# Patient Record
Sex: MALE | Race: WHITE | NOT HISPANIC OR LATINO
[De-identification: names, ages, dates, MRNs, and addresses within clinical notes are randomized per-mention and may not be internally consistent; named-entity substitution may affect disease eponyms.]

---
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
## 2018-09-20 ENCOUNTER — OFFICE VISIT (OUTPATIENT)
Dept: FAMILY MEDICINE CLINIC | Facility: CLINIC | Age: 18
End: 2018-09-20
Payer: COMMERCIAL

## 2018-09-20 VITALS
WEIGHT: 163 LBS | TEMPERATURE: 98 F | SYSTOLIC BLOOD PRESSURE: 100 MMHG | RESPIRATION RATE: 16 BRPM | DIASTOLIC BLOOD PRESSURE: 60 MMHG | OXYGEN SATURATION: 97 % | BODY MASS INDEX: 25 KG/M2 | HEART RATE: 84 BPM

## 2018-09-20 DIAGNOSIS — Z72.0 TOBACCO USE: ICD-10-CM

## 2018-09-20 DIAGNOSIS — R06.2 WHEEZING: ICD-10-CM

## 2018-09-20 DIAGNOSIS — J06.9 VIRAL URI WITH COUGH: Primary | ICD-10-CM

## 2018-09-20 PROCEDURE — 99203 OFFICE O/P NEW LOW 30 MIN: CPT | Performed by: PHYSICIAN ASSISTANT

## 2018-09-20 RX ORDER — PREDNISONE 10 MG/1
20 TABLET ORAL 2 TIMES DAILY
Qty: 20 TABLET | Refills: 0 | Status: SHIPPED | OUTPATIENT
Start: 2018-09-20 | End: 2018-09-25

## 2018-09-20 RX ORDER — ALBUTEROL SULFATE 90 UG/1
2 AEROSOL, METERED RESPIRATORY (INHALATION) EVERY 4 HOURS PRN
Qty: 1 INHALER | Refills: 1 | Status: SHIPPED | OUTPATIENT
Start: 2018-09-20 | End: 2018-10-23 | Stop reason: ALTCHOICE

## 2018-09-20 NOTE — PROGRESS NOTES
CHIEF COMPLAINT:   Patient presents with:  URI: x 3-4 days. sinus and chest congestion. HPI:   Chirag Alcantar is a 21year old male who presents for upper respiratory symptoms for  4 days.  Patient reports nasal congestion, sinus pressure and pr GENERAL: well developed, well nourished,in no apparent distress  SKIN: no rashes,no suspicious lesions  HEAD: atraumatic, normocephalic.  no tenderness on palpation of  sinuses  EYES: conjunctiva clear, EOM intact  EARS: TM's clear bilaterally  NOSE: Nostr 1.  Prednisone 20 mg twice daily for 5 days. 2.  Albuterol inhaler 2 puffs inhaled at least 3  Times daily for 5 days or every 4-6 hours as needed.    3.  Encourage fluids, humidifier/vaporizor at bedside, elevate head of bed (sleep with extra pillow), va · You may use acetaminophen or ibuprofen to control pain and fever, unless another medicine was prescribed. If you have chronic liver or kidney disease, have ever had a stomach ulcer or gastrointestinal bleeding, or are taking blood-thinning medicines, linwood

## 2018-09-20 NOTE — PATIENT INSTRUCTIONS
1.  Prednisone 20 mg twice daily for 5 days. 2.  Albuterol inhaler 2 puffs inhaled at least 3  Times daily for 5 days or every 4-6 hours as needed.    3.  Encourage fluids, humidifier/vaporizor at bedside, elevate head of bed (sleep with extra pillow), va is ill with a viral infection or fever. It may cause severe liver or brain damage. · Your appetite may be poor, so a light diet is fine. Avoid dehydration by drinking 6 to 8 glasses of fluids per day (water, soft drinks, juices, tea, or soup).  Extra fluid

## 2018-10-23 ENCOUNTER — NURSE ONLY (OUTPATIENT)
Dept: FAMILY MEDICINE CLINIC | Facility: CLINIC | Age: 18
End: 2018-10-23
Payer: COMMERCIAL

## 2018-10-23 VITALS
DIASTOLIC BLOOD PRESSURE: 70 MMHG | TEMPERATURE: 98 F | BODY MASS INDEX: 24 KG/M2 | WEIGHT: 155.38 LBS | OXYGEN SATURATION: 98 % | RESPIRATION RATE: 18 BRPM | HEART RATE: 88 BPM | SYSTOLIC BLOOD PRESSURE: 110 MMHG

## 2018-10-23 DIAGNOSIS — R10.13 EPIGASTRIC PAIN: Primary | ICD-10-CM

## 2018-10-23 DIAGNOSIS — Z72.0 TOBACCO USE: ICD-10-CM

## 2018-10-23 DIAGNOSIS — R11.2 NON-INTRACTABLE VOMITING WITH NAUSEA, UNSPECIFIED VOMITING TYPE: ICD-10-CM

## 2018-10-23 DIAGNOSIS — Z87.19 H/O GASTROESOPHAGEAL REFLUX (GERD): ICD-10-CM

## 2018-10-23 PROCEDURE — 99214 OFFICE O/P EST MOD 30 MIN: CPT | Performed by: PHYSICIAN ASSISTANT

## 2018-10-23 RX ORDER — PANTOPRAZOLE SODIUM 40 MG/1
40 TABLET, DELAYED RELEASE ORAL
Qty: 30 TABLET | Refills: 0 | Status: SHIPPED | OUTPATIENT
Start: 2018-10-23

## 2018-10-23 NOTE — PATIENT INSTRUCTIONS
1.  Trial of Protonix as prescribed (this is an antacid).    2.  Lifestyle modifications as discussed:  Stop smoking, no alcohol use, avoid eating within 2 hours of bedtime/lying down, avoid NSAIDs (anti-inflammatories), avoid spicy/acidic foods, avoid carb juices (orange, grapefruit, lemon). · Don’t eat large meals, especially at night. Frequent, smaller meals are best. Don't lie down right after eating. And don’t eat anything 3 hours before going to bed. · Don't drink alcohol or smoke.  As much as possible liquids)  · Blood in the stool or vomit (red or black in color)  · Feeling weak or dizzy  · Fever of 100.4ºF (38ºC) or higher, or as directed by your healthcare provider  Date Last Reviewed: 3/1/2018  © 5490-4258 [employer].  [address]

## 2018-10-23 NOTE — PROGRESS NOTES
CHIEF COMPLAINT:   Patient presents with:  Nausea: vomiting, cold sweats  x 2 weeks       HPI:   Karen Rosario is a 21year old male who presents for complaints of nausea, vomiting and cold sweats x 2 weeks. Constant sensation of lump in throat.   ( RESPIRATORY: denies shortness of breath, cough or wheezing  GI:See HPI  NEURO: denies headaches, loss of bowel or bladder control    EXAM:   /70 (BP Location: Left arm, Patient Position: Sitting, Cuff Size: adult)   Pulse 88   Temp 97.9 °F (36.6 °C) 2.  Lifestyle modifications as discussed:  Stop smoking, no alcohol use, avoid eating within 2 hours of bedtime/lying down, avoid NSAIDs (anti-inflammatories), avoid spicy/acidic foods, avoid carbonated beverages or caffeine. See below.   3.  Follow up wit · Don’t eat large meals, especially at night. Frequent, smaller meals are best. Don't lie down right after eating. And don’t eat anything 3 hours before going to bed. · Don't drink alcohol or smoke. As much as possible, stay away from second hand smoke. · Blood in the stool or vomit (red or black in color)  · Feeling weak or dizzy  · Fever of 100.4ºF (38ºC) or higher, or as directed by your healthcare provider  Date Last Reviewed: 3/1/2018  © 4719-1351 [employer].  [address] Herman Riddhi

## 2018-11-28 VITALS
SYSTOLIC BLOOD PRESSURE: 100 MMHG | WEIGHT: 138 LBS | TEMPERATURE: 98.7 F | HEART RATE: 54 BPM | BODY MASS INDEX: 20.92 KG/M2 | DIASTOLIC BLOOD PRESSURE: 64 MMHG | OXYGEN SATURATION: 96 % | HEIGHT: 68 IN

## 2018-11-29 ENCOUNTER — CHARTING TRANS (OUTPATIENT)
Dept: OTHER | Age: 18
End: 2018-11-29

## 2019-01-04 ENCOUNTER — NURSE ONLY (OUTPATIENT)
Dept: FAMILY MEDICINE CLINIC | Facility: CLINIC | Age: 19
End: 2019-01-04
Payer: COMMERCIAL

## 2019-01-04 VITALS
BODY MASS INDEX: 24 KG/M2 | WEIGHT: 158 LBS | HEART RATE: 96 BPM | OXYGEN SATURATION: 98 % | TEMPERATURE: 99 F | SYSTOLIC BLOOD PRESSURE: 119 MMHG | DIASTOLIC BLOOD PRESSURE: 87 MMHG

## 2019-01-04 PROBLEM — Z02.9 ADMINISTRATIVE ENCOUNTER: Status: ACTIVE | Noted: 2019-01-04

## 2019-01-04 RX ORDER — FLUOXETINE HYDROCHLORIDE 20 MG/1
CAPSULE ORAL
Refills: 0 | COMMUNITY
Start: 2018-12-05

## 2019-01-04 RX ORDER — LEVOFLOXACIN 500 MG/1
TABLET, FILM COATED ORAL
Refills: 0 | COMMUNITY
Start: 2018-11-07 | End: 2019-01-04

## 2019-01-04 RX ORDER — MIRTAZAPINE 30 MG/1
TABLET, FILM COATED ORAL
Refills: 0 | COMMUNITY
Start: 2018-12-05

## 2019-01-04 NOTE — PROGRESS NOTES
Pt. To [facility] c/o dizziness on and off for 2 weeks. Reports seems to happen every other day for one to several hours. Reports feels lightheaded, dizzy, some times room spinning.   Reports happens at sporadic times throughout the day, and unsure if anything m

## 2019-01-09 ENCOUNTER — OFFICE VISIT (OUTPATIENT)
Dept: GASTROENTEROLOGY | Age: 19
End: 2019-01-09

## 2019-01-09 ENCOUNTER — LAB SERVICES (OUTPATIENT)
Dept: LAB | Age: 19
End: 2019-01-09

## 2019-01-09 VITALS
BODY MASS INDEX: 23.49 KG/M2 | HEIGHT: 68 IN | SYSTOLIC BLOOD PRESSURE: 110 MMHG | HEART RATE: 68 BPM | DIASTOLIC BLOOD PRESSURE: 60 MMHG | WEIGHT: 155 LBS

## 2019-01-09 DIAGNOSIS — K21.9 GASTROESOPHAGEAL REFLUX DISEASE WITHOUT ESOPHAGITIS: ICD-10-CM

## 2019-01-09 DIAGNOSIS — R11.0 CHRONIC NAUSEA: ICD-10-CM

## 2019-01-09 DIAGNOSIS — R11.0 CHRONIC NAUSEA: Primary | ICD-10-CM

## 2019-01-09 LAB
BASOPHIL %: 0.6 % (ref 0–1.2)
BASOPHIL ABSOLUTE #: 0 10*3/UL (ref 0–0.1)
DIFFERENTIAL TYPE: NORMAL
EOSINOPHIL %: 1.9 % (ref 0–10)
EOSINOPHIL ABSOLUTE #: 0.1 10*3/UL (ref 0–0.5)
HEMATOCRIT: 42.5 % (ref 40–51)
HEMOGLOBIN: 14.3 G/DL (ref 13.7–17.5)
LYMPH PERCENT: 24.3 % (ref 20.5–51.1)
LYMPHOCYTE ABSOLUTE #: 1.3 10*3/UL (ref 1.2–3.4)
MEAN CORPUSCULAR HGB CONCENTRATION: 33.6 % (ref 32–36)
MEAN CORPUSCULAR HGB: 30.1 PG (ref 27–34)
MEAN CORPUSCULAR VOLUME: 89.5 FL (ref 79–95)
MEAN PLATELET VOLUME: 12.4 FL (ref 8.6–12.4)
MONOCYTE ABSOLUTE #: 0.4 10*3/UL (ref 0.2–0.9)
MONOCYTE PERCENT: 8.2 % (ref 4.3–12.9)
NEUTROPHIL ABSOLUTE #: 3.5 10*3/UL (ref 1.4–6.5)
NEUTROPHIL PERCENT: 65 % (ref 34–73.5)
PLATELET COUNT: 215 10*3/UL (ref 150–400)
RED BLOOD CELL COUNT: 4.75 10*6/UL (ref 3.9–5.7)
RED CELL DISTRIBUTION WIDTH: 12.4 % (ref 11.3–14.8)
WHITE BLOOD CELL COUNT: 5.4 10*3/UL (ref 4–10)

## 2019-01-09 PROCEDURE — 36415 COLL VENOUS BLD VENIPUNCTURE: CPT | Performed by: INTERNAL MEDICINE

## 2019-01-09 PROCEDURE — 85025 COMPLETE CBC W/AUTO DIFF WBC: CPT | Performed by: INTERNAL MEDICINE

## 2019-01-09 PROCEDURE — 80053 COMPREHEN METABOLIC PANEL: CPT | Performed by: INTERNAL MEDICINE

## 2019-01-09 PROCEDURE — 83516 IMMUNOASSAY NONANTIBODY: CPT | Performed by: INTERNAL MEDICINE

## 2019-01-09 PROCEDURE — 99244 OFF/OP CNSLTJ NEW/EST MOD 40: CPT | Performed by: INTERNAL MEDICINE

## 2019-01-09 RX ORDER — FAMOTIDINE 40 MG/1
40 TABLET, FILM COATED ORAL DAILY
COMMUNITY

## 2019-01-09 RX ORDER — FLUOXETINE HYDROCHLORIDE 20 MG/1
CAPSULE ORAL
COMMUNITY
Start: 2018-12-05

## 2019-01-09 RX ORDER — PANTOPRAZOLE SODIUM 40 MG/1
TABLET, DELAYED RELEASE ORAL
COMMUNITY
Start: 2018-10-23 | End: 2019-02-05 | Stop reason: SDUPTHER

## 2019-01-09 RX ORDER — MIRTAZAPINE 30 MG/1
TABLET, FILM COATED ORAL
COMMUNITY
Start: 2018-09-30

## 2019-01-09 ASSESSMENT — ENCOUNTER SYMPTOMS
CONFUSION: 0
TREMORS: 0
CONSTIPATION: 0
HEADACHES: 0
SHORTNESS OF BREATH: 0
RECTAL PAIN: 0
BRUISES/BLEEDS EASILY: 0
ANAL BLEEDING: 0
SPEECH DIFFICULTY: 0
ABDOMINAL DISTENTION: 0
COLOR CHANGE: 0
CHEST TIGHTNESS: 0
VOMITING: 0
EYE REDNESS: 0
DIARRHEA: 0
NAUSEA: 1
SORE THROAT: 0
LIGHT-HEADEDNESS: 0
CHILLS: 0
COUGH: 0
UNEXPECTED WEIGHT CHANGE: 0
FATIGUE: 0
DIAPHORESIS: 0
APPETITE CHANGE: 0
ABDOMINAL PAIN: 1
SEIZURES: 0
BLOOD IN STOOL: 0
EYE PAIN: 0
CHOKING: 0
BACK PAIN: 0

## 2019-01-10 LAB
ALBUMIN SERPL BCG-MCNC: 5.1 G/DL (ref 3.6–5.1)
ALP SERPL-CCNC: 84 U/L (ref 45–115)
ALT SERPL W/O P-5'-P-CCNC: 21 U/L (ref 10–35)
AST SERPL-CCNC: 20 U/L (ref 9–37)
BILIRUB SERPL-MCNC: <0.2 MG/DL (ref 0–1)
BUN SERPL-MCNC: 11 MG/DL (ref 6–27)
CALCIUM SERPL-MCNC: 9.8 MG/DL (ref 8.6–10.6)
CHLORIDE SERPL-SCNC: 102 MMOL/L (ref 96–107)
CREAT SERPL-MCNC: 0.9 MG/DL (ref 0.6–1.6)
GFR SERPL CREATININE-BSD FRML MDRD: >60 ML/MIN/{1.73M2}
GFR SERPL CREATININE-BSD FRML MDRD: >60 ML/MIN/{1.73M2}
GLIADIN PEPTIDE IGA SER IA-ACNC: 0.7 U/ML (ref 0–7)
GLIADIN PEPTIDE IGG SER IA-ACNC: 0.4 U/ML (ref 0–7)
GLUCOSE SERPL-MCNC: 108 MG/DL (ref 70–200)
HCO3 SERPL-SCNC: 27 MMOL/L (ref 22–32)
POTASSIUM SERPL-SCNC: 4.2 MMOL/L (ref 3.5–5.3)
PROT SERPL-MCNC: 7.1 G/DL (ref 6.2–8.1)
SODIUM SERPL-SCNC: 140 MMOL/L (ref 136–146)
TTG IGA SER IA-ACNC: 0.3 U/ML (ref 0–7)
TTG IGG SER IA-ACNC: <0.6 U/ML (ref 0–7)

## 2019-01-29 ENCOUNTER — TELEPHONE (OUTPATIENT)
Dept: GASTROENTEROLOGY | Age: 19
End: 2019-01-29

## 2019-01-29 ENCOUNTER — OFFICE VISIT (OUTPATIENT)
Dept: FAMILY MEDICINE CLINIC | Facility: CLINIC | Age: 19
End: 2019-01-29
Payer: COMMERCIAL

## 2019-01-29 VITALS
RESPIRATION RATE: 18 BRPM | OXYGEN SATURATION: 98 % | TEMPERATURE: 98 F | SYSTOLIC BLOOD PRESSURE: 120 MMHG | DIASTOLIC BLOOD PRESSURE: 76 MMHG | HEART RATE: 94 BPM

## 2019-01-29 DIAGNOSIS — Z20.818 STREPTOCOCCUS EXPOSURE: ICD-10-CM

## 2019-01-29 DIAGNOSIS — J00 ACUTE NASOPHARYNGITIS: Primary | ICD-10-CM

## 2019-01-29 LAB — CONTROL LINE PRESENT WITH A CLEAR BACKGROUND (YES/NO): YES YES/NO

## 2019-01-29 PROCEDURE — 87880 STREP A ASSAY W/OPTIC: CPT | Performed by: PHYSICIAN ASSISTANT

## 2019-01-29 PROCEDURE — 99213 OFFICE O/P EST LOW 20 MIN: CPT | Performed by: PHYSICIAN ASSISTANT

## 2019-01-30 NOTE — PROGRESS NOTES
CHIEF COMPLAINT:   Patient presents with:  Sinus Problem: sinus pressure and congestion, cough, sore throat, HA x 3 days       HPI:   Elizabeth Tariq is a 19year old male who presents for upper respiratory symptoms for  3 days.  Patient reports sore thr NOSE: Nostrils patent, clear nasal discharge, nasal mucosa edematous/erythematous   THROAT: Oral mucosa pink, moist. Posterior pharynx is (+)erythematous. without exudates. Tonsils 1+/4.     NECK: Supple, non-tender  LUNGS: clear to auscultation bilaterally Understanding the Cold Virus  Colds are the most common illness that people get. Most adults get 2 or 3 colds per year, and most children get 5 to 7 colds per year. Colds may be caused by over 200 types of viruses.  The most common of these are rhinoviruses You can help reduce the spread of cold viruses. This can help both you and others avoid getting colds. Follow these tips:  · Wash your hands well anytime you may have come into contact with cold viruses. Wash your hands for at least 20 seconds.  When you ca

## 2019-01-30 NOTE — PATIENT INSTRUCTIONS
1.  Rapid strep negative.    2.  Encourage fluids, humidifier/vaporizor at bedside, elevate head of bed (sleep with extra pillow), vapor rub to chest, steam therapy if no fever, warm compresses for sinus pressure if no fever, salt water gargles for sore thr nasal symptoms, including stuffiness. · Prescription or over-the-counter pain medicines. These can help with headaches and sore throat. · Self-care. This includes extra rest, using humidifiers, and drinking more fluids.  These help you feel better while y worse after about 10 days  · Headache, sleepiness, or confusion that gets worse   Date Last Reviewed: 3/28/2016  © 2559-0266 [employer]. [address] [facility], [address] [place] [address]. All rights reserved.  This information is not intended as a gaviria

## 2019-02-05 ENCOUNTER — APPOINTMENT (OUTPATIENT)
Dept: GASTROENTEROLOGY | Age: 19
End: 2019-02-05
Attending: INTERNAL MEDICINE

## 2019-02-05 DIAGNOSIS — R11.0 NAUSEA: ICD-10-CM

## 2019-02-05 DIAGNOSIS — K29.70 GASTRITIS WITHOUT BLEEDING: ICD-10-CM

## 2019-02-05 DIAGNOSIS — R11.0 CHRONIC NAUSEA: Primary | ICD-10-CM

## 2019-02-05 PROCEDURE — 88305 TISSUE EXAM BY PATHOLOGIST: CPT | Performed by: INTERNAL MEDICINE

## 2019-02-05 PROCEDURE — 43239 EGD BIOPSY SINGLE/MULTIPLE: CPT | Performed by: INTERNAL MEDICINE

## 2019-02-05 RX ORDER — PANTOPRAZOLE SODIUM 40 MG/1
40 TABLET, DELAYED RELEASE ORAL 2 TIMES DAILY
Qty: 60 TABLET | Refills: 3 | Status: SHIPPED | OUTPATIENT
Start: 2019-02-05 | End: 2019-03-07

## 2019-02-07 LAB — AP REPORT: NORMAL

## 2019-02-26 ENCOUNTER — OFFICE VISIT (OUTPATIENT)
Dept: FAMILY MEDICINE CLINIC | Facility: CLINIC | Age: 19
End: 2019-02-26
Payer: COMMERCIAL

## 2019-02-26 VITALS
TEMPERATURE: 99 F | OXYGEN SATURATION: 98 % | SYSTOLIC BLOOD PRESSURE: 110 MMHG | RESPIRATION RATE: 18 BRPM | HEART RATE: 79 BPM | DIASTOLIC BLOOD PRESSURE: 72 MMHG

## 2019-02-26 DIAGNOSIS — K29.00 ACUTE GASTRITIS, PRESENCE OF BLEEDING UNSPECIFIED, UNSPECIFIED GASTRITIS TYPE: ICD-10-CM

## 2019-02-26 DIAGNOSIS — Z02.89 ENCOUNTER TO OBTAIN EXCUSE FROM WORK: Primary | ICD-10-CM

## 2019-02-26 PROCEDURE — 99213 OFFICE O/P EST LOW 20 MIN: CPT | Performed by: PHYSICIAN ASSISTANT

## 2019-02-26 NOTE — PROGRESS NOTES
CHIEF COMPLAINT:     Patient presents with:  Vomiting: x 1 day       HPI:   Lew Montes is a 19year old male who presents today requesting work excuse note. Missed yesterday due to n/v.   Reports felt nauseated 2 days ago, followed by n/v all day y developed, well nourished,in no apparent distress  SKIN: no rashes,no suspicious lesions  HEENT;  NCAT, eomi,sclera anicteric, ext ears/TMs clear, op clear with mmm  NECK Supple, no lad, trachea midline   RESP  CTA lamin, no w/r/r.  Normal effort  CV  RRR, no

## 2019-03-05 VITALS
HEIGHT: 68 IN | BODY MASS INDEX: 24.1 KG/M2 | WEIGHT: 159 LBS | DIASTOLIC BLOOD PRESSURE: 70 MMHG | TEMPERATURE: 98.3 F | SYSTOLIC BLOOD PRESSURE: 118 MMHG | HEART RATE: 94 BPM | OXYGEN SATURATION: 97 %

## 2019-03-11 ENCOUNTER — TELEPHONE (OUTPATIENT)
Dept: SCHEDULING | Age: 19
End: 2019-03-11

## 2019-03-11 RX ORDER — PANTOPRAZOLE SODIUM 40 MG/1
40 TABLET, DELAYED RELEASE ORAL 2 TIMES DAILY
Qty: 60 TABLET | Refills: 5 | Status: SHIPPED | OUTPATIENT
Start: 2019-03-11 | End: 2019-07-08 | Stop reason: SDUPTHER

## 2019-04-11 ENCOUNTER — OFFICE VISIT (OUTPATIENT)
Dept: FAMILY MEDICINE CLINIC | Facility: CLINIC | Age: 19
End: 2019-04-11
Payer: COMMERCIAL

## 2019-04-11 VITALS
RESPIRATION RATE: 18 BRPM | DIASTOLIC BLOOD PRESSURE: 68 MMHG | BODY MASS INDEX: 23.49 KG/M2 | SYSTOLIC BLOOD PRESSURE: 106 MMHG | TEMPERATURE: 98 F | HEART RATE: 96 BPM | HEIGHT: 68 IN | OXYGEN SATURATION: 97 % | WEIGHT: 155 LBS

## 2019-04-11 DIAGNOSIS — R11.10 VOMITING AND DIARRHEA: Primary | ICD-10-CM

## 2019-04-11 DIAGNOSIS — R19.7 VOMITING AND DIARRHEA: Primary | ICD-10-CM

## 2019-04-11 PROCEDURE — 99213 OFFICE O/P EST LOW 20 MIN: CPT | Performed by: PHYSICIAN ASSISTANT

## 2019-04-11 RX ORDER — ONDANSETRON 4 MG/1
4 TABLET, ORALLY DISINTEGRATING ORAL EVERY 8 HOURS PRN
Qty: 6 TABLET | Refills: 0 | Status: SHIPPED | OUTPATIENT
Start: 2019-04-11

## 2019-04-11 NOTE — PROGRESS NOTES
CHIEF COMPLAINT:     Patient presents with:  Nausea: with vomitting and diarrhea, feels dehydrated, temp of 100.00 yesterday - x3 days      HPI:   Karen Rosario is a 19year old male who presents with complaints of vomiting and diarrhea for the past 2 Comment: cannabis 1-3 grams daily since 5/2018, last use9/1/18        REVIEW OF SYSTEMS:   GENERAL: Denies fever, chills,weight change, decreased appetite  SKIN: Denies rashes, skin wounds or ulcers.   EYES: Denies blurred vision or double vision  HENT: 3. Increase fluids  4. Follow up with GI  5.  If worsening symptoms seek treatment      Nonspecific Vomiting and Diarrhea (Adult)  Vomiting and diarrhea can have many causes, including:  · Helping your body get rid of harmful substances   · Gastroenteritis · Caffeine, tobacco, and alcohol can make the diarrhea, cramping, and pain worse. Remember, caffeine not only is in coffee, but also is in chocolate, some energy drinks, and teas. Diet  · Water and clear liquids are important so you don't get dehydrated. · Limit fiber. Avoid raw or cooked vegetables, fresh fruits (except bananas) and bran cereals. · Limit caffeine and chocolate. No spices or seasonings except salt.   During the next 24 hours:  · Gradually resume a normal diet, as you feel better and your s · Fever of 100.4°F (38.0°C) or higher, or as directed by your healthcare provider  · Yellowish color to your skin or the whites of your eyes  · Signs of dehydration, such as dry mouth, little urine (less than every 6 hours), or very dark urine  Date Last R

## 2019-07-08 ENCOUNTER — TELEPHONE (OUTPATIENT)
Dept: GASTROENTEROLOGY | Age: 19
End: 2019-07-08

## 2019-07-08 RX ORDER — PANTOPRAZOLE SODIUM 40 MG/1
TABLET, DELAYED RELEASE ORAL
Qty: 180 TABLET | Refills: 1 | Status: SHIPPED | OUTPATIENT
Start: 2019-07-08 | End: 2020-07-01

## 2019-11-05 ENCOUNTER — OFFICE VISIT (OUTPATIENT)
Dept: FAMILY MEDICINE CLINIC | Facility: CLINIC | Age: 19
End: 2019-11-05
Payer: COMMERCIAL

## 2019-11-05 VITALS
BODY MASS INDEX: 24 KG/M2 | HEART RATE: 107 BPM | WEIGHT: 155 LBS | RESPIRATION RATE: 16 BRPM | SYSTOLIC BLOOD PRESSURE: 110 MMHG | DIASTOLIC BLOOD PRESSURE: 66 MMHG | OXYGEN SATURATION: 97 % | TEMPERATURE: 100 F

## 2019-11-05 DIAGNOSIS — J03.90 EXUDATIVE TONSILLITIS: Primary | ICD-10-CM

## 2019-11-05 DIAGNOSIS — J02.9 SORE THROAT: ICD-10-CM

## 2019-11-05 PROCEDURE — 87880 STREP A ASSAY W/OPTIC: CPT | Performed by: PHYSICIAN ASSISTANT

## 2019-11-05 PROCEDURE — 87081 CULTURE SCREEN ONLY: CPT | Performed by: PHYSICIAN ASSISTANT

## 2019-11-05 PROCEDURE — 86308 HETEROPHILE ANTIBODY SCREEN: CPT | Performed by: PHYSICIAN ASSISTANT

## 2019-11-05 PROCEDURE — 99213 OFFICE O/P EST LOW 20 MIN: CPT | Performed by: PHYSICIAN ASSISTANT

## 2019-11-05 RX ORDER — AZITHROMYCIN 250 MG/1
TABLET, FILM COATED ORAL
Qty: 6 TABLET | Refills: 0 | Status: SHIPPED | OUTPATIENT
Start: 2019-11-05

## 2019-11-05 NOTE — PROGRESS NOTES
CHIEF COMPLAINT:   Patient presents with:  Sore Throat: x 2 days. fever x yesterday only. pt did not take temp. no couh/congestion      HPI:   Cathie Pop is a 22year old male who presents for sore throat 7/10 for  2 days.   Patient reports he kenny /66 (BP Location: Left arm, Patient Position: Sitting, Cuff Size: adult)   Pulse 107   Temp 99.8 °F (37.7 °C) (Tympanic)   Resp 16   Wt 155 lb (70.3 kg)   SpO2 97%   BMI 23.57 kg/m²   GENERAL: well developed, well nourished,in no apparent distress  S Erendira Reyna is a 22year old male who presents with symptoms that are consistent with    ASSESSMENT:   Sore throat  Exudative tonsillitis  (primary encounter diagnosis)    PLAN: Meds as below. See patient Instructions.  -Will send throat culture. · Rest at home. Drink plenty of fluids so you won't get dehydrated. · If the test is positive for strep, you or your child should not go to work or school for the first 2 days of taking the antibiotics.  After this time, you or your child will not be conta Follow up with your healthcare provider or our staff if you or your child don't get better over the next week. When to seek medical advice  Call your healthcare provider right away if any of these occur:  · Fever as directed by your healthcare provider.  Mya Aldana

## 2019-11-05 NOTE — PATIENT INSTRUCTIONS
-Push fluids  -Tylenol  -Chloraseptic spray  -Tea with honey  -If you are unable to swallow or have any worsening symptoms, please go to the ER. Pharyngitis (Sore Throat), Report Pending    Pharyngitis (sore throat) is often due to a virus.  It can als warm salt water will also help reduce throat pain. Dissolve 1/2 teaspoon of salt in 1 glass of warm water. Children can sip on juice or a popsicle. Children 5 years and older can also suck on a lollipop or hard candy.   · Don't eat salty or spicy foods or g dizziness  · Trouble breathing or noisy breathing  · Muffled voice  · New rash  · Other symptoms getting worse  Prevention  Here are steps you can take to help prevent an infection:  · Keep good hand washing habits.   · Don’t have close contact with people

## 2020-01-01 NOTE — LETTER
Date: 6/25/2022    Patient Name: Phyllis Ponce          To Whom it may concern: This letter has been written at the patient's request. The above patient was seen at the [facility] for treatment of a medical condition. This patient should be excused from attending work 6/17-18-9/2022, and 6/22-23/2022. .    The patient may return to work 6/27/22  With no restrictions.         Sincerely,          Manolo Escalante, DO You can access the FollowMyHealth Patient Portal offered by [facility] by registering at the following website: [email or web address]. By joining [facility]’s FollowMyHealth portal, you will also be able to view your health information using other applications (apps) compatible with our system.

## 2020-07-01 RX ORDER — PANTOPRAZOLE SODIUM 40 MG/1
TABLET, DELAYED RELEASE ORAL
Qty: 180 TABLET | Refills: 0 | Status: SHIPPED | OUTPATIENT
Start: 2020-07-01 | End: 2020-09-22

## 2020-09-16 ENCOUNTER — V-VISIT (OUTPATIENT)
Dept: GASTROENTEROLOGY | Age: 20
End: 2020-09-16

## 2020-09-16 DIAGNOSIS — R11.0 CHRONIC NAUSEA: Primary | ICD-10-CM

## 2020-09-16 PROCEDURE — 99214 OFFICE O/P EST MOD 30 MIN: CPT | Performed by: INTERNAL MEDICINE

## 2020-09-16 RX ORDER — TRAZODONE HYDROCHLORIDE 50 MG/1
50 TABLET ORAL AT BEDTIME
COMMUNITY
Start: 2020-07-09

## 2020-09-16 ASSESSMENT — ENCOUNTER SYMPTOMS
CONSTIPATION: 0
EYE PAIN: 0
CHOKING: 0
SPEECH DIFFICULTY: 0
SORE THROAT: 0
HEADACHES: 0
BLOOD IN STOOL: 0
SEIZURES: 0
DIARRHEA: 0
TREMORS: 0
APPETITE CHANGE: 0
CHILLS: 0
BRUISES/BLEEDS EASILY: 0
ABDOMINAL PAIN: 0
UNEXPECTED WEIGHT CHANGE: 0
VOMITING: 0
COLOR CHANGE: 0
ABDOMINAL DISTENTION: 1
SHORTNESS OF BREATH: 0
EYE REDNESS: 0
COUGH: 0
DIAPHORESIS: 0
CONFUSION: 0
FATIGUE: 0
RECTAL PAIN: 0
ANAL BLEEDING: 0
BACK PAIN: 0
NAUSEA: 1
LIGHT-HEADEDNESS: 0
CHEST TIGHTNESS: 0

## 2020-09-22 RX ORDER — PANTOPRAZOLE SODIUM 40 MG/1
TABLET, DELAYED RELEASE ORAL
Qty: 180 TABLET | Refills: 0 | Status: SHIPPED | OUTPATIENT
Start: 2020-09-22 | End: 2021-01-11

## 2021-01-11 RX ORDER — PANTOPRAZOLE SODIUM 40 MG/1
TABLET, DELAYED RELEASE ORAL
Qty: 180 TABLET | Refills: 2 | Status: SHIPPED | OUTPATIENT
Start: 2021-01-11

## 2022-05-16 ENCOUNTER — OFFICE VISIT (OUTPATIENT)
Dept: FAMILY MEDICINE CLINIC | Facility: CLINIC | Age: 22
End: 2022-05-16
Payer: COMMERCIAL

## 2022-05-16 VITALS
WEIGHT: 165 LBS | TEMPERATURE: 98 F | OXYGEN SATURATION: 98 % | SYSTOLIC BLOOD PRESSURE: 134 MMHG | HEART RATE: 81 BPM | DIASTOLIC BLOOD PRESSURE: 64 MMHG | RESPIRATION RATE: 18 BRPM | BODY MASS INDEX: 25 KG/M2

## 2022-05-16 DIAGNOSIS — R10.13 ABDOMINAL PAIN, EPIGASTRIC: ICD-10-CM

## 2022-05-16 DIAGNOSIS — K92.0 HEMATEMESIS WITH NAUSEA: Primary | ICD-10-CM

## 2022-05-16 NOTE — PROGRESS NOTES
Rexie Goldberg is a 19year old male who presents to [facility] with c/o abdominal with nausea and vomiting. Pt notes nausea and 4 episodes of vomiting this past week. Notes last night he developed generalized abdominal pain and now notes blood in emesis. Reports hx of ulcers. Discussed HPI and exam with pt. We discussed the limited diagnostic capacity of this clinic and there are dangerous conditions associated with his abdominal pain, vomiting and bloody emesis that I can not fully rule out. Accompanied by: self  After triage, higher acuity of care was recommended to Rexie Goldberg today. Rationale: Need for further evaluation and management outside the scope of practice for the walk in clinic  Site recommendation: [facility] or Main ED   Patient declined transport via EMS or having someone else drive him. Patient/parent verbalized understanding of rationale for further evaluation and was stable upon discharge.

## 2022-06-09 ENCOUNTER — OFFICE VISIT (OUTPATIENT)
Dept: FAMILY MEDICINE CLINIC | Facility: CLINIC | Age: 22
End: 2022-06-09
Payer: COMMERCIAL

## 2022-06-09 VITALS
BODY MASS INDEX: 25.76 KG/M2 | HEIGHT: 68 IN | WEIGHT: 170 LBS | SYSTOLIC BLOOD PRESSURE: 110 MMHG | TEMPERATURE: 98 F | RESPIRATION RATE: 16 BRPM | DIASTOLIC BLOOD PRESSURE: 70 MMHG | HEART RATE: 81 BPM | OXYGEN SATURATION: 97 %

## 2022-06-09 DIAGNOSIS — R11.0 NAUSEA: Primary | ICD-10-CM

## 2022-06-09 DIAGNOSIS — K21.9 GASTROESOPHAGEAL REFLUX DISEASE, UNSPECIFIED WHETHER ESOPHAGITIS PRESENT: ICD-10-CM

## 2022-06-09 PROCEDURE — 3074F SYST BP LT 130 MM HG: CPT | Performed by: PHYSICIAN ASSISTANT

## 2022-06-09 PROCEDURE — 3008F BODY MASS INDEX DOCD: CPT | Performed by: PHYSICIAN ASSISTANT

## 2022-06-09 PROCEDURE — 99213 OFFICE O/P EST LOW 20 MIN: CPT | Performed by: PHYSICIAN ASSISTANT

## 2022-06-09 PROCEDURE — 3078F DIAST BP <80 MM HG: CPT | Performed by: PHYSICIAN ASSISTANT

## 2022-06-09 RX ORDER — PANTOPRAZOLE SODIUM 20 MG/1
20 TABLET, DELAYED RELEASE ORAL
Qty: 20 TABLET | Refills: 0 | Status: SHIPPED | OUTPATIENT
Start: 2022-06-09

## 2022-06-09 RX ORDER — ONDANSETRON 4 MG/1
4 TABLET, FILM COATED ORAL EVERY 8 HOURS PRN
Qty: 20 TABLET | Refills: 0 | Status: SHIPPED | OUTPATIENT
Start: 2022-06-09

## 2022-06-22 ENCOUNTER — TELEPHONE (OUTPATIENT)
Dept: FAMILY MEDICINE CLINIC | Facility: CLINIC | Age: 22
End: 2022-06-22

## 2022-06-22 NOTE — TELEPHONE ENCOUNTER
Rn spoke with Dr. Severino Ann regarding visit on Saturday. Dr. Seevrino Ann advises that if pt is still having stomach issues he needs to be contacting the surgeon who did his procedure to follow up with them because he is still with in the recovery period.     Pt states his issues are nausea and acid reflux which he has had for year    Per DS - okay to keep visit

## 2022-06-25 ENCOUNTER — OFFICE VISIT (OUTPATIENT)
Dept: FAMILY MEDICINE CLINIC | Facility: CLINIC | Age: 22
End: 2022-06-25
Payer: COMMERCIAL

## 2022-06-25 VITALS
DIASTOLIC BLOOD PRESSURE: 64 MMHG | HEIGHT: 68 IN | HEART RATE: 71 BPM | WEIGHT: 171 LBS | TEMPERATURE: 97 F | RESPIRATION RATE: 16 BRPM | SYSTOLIC BLOOD PRESSURE: 104 MMHG | BODY MASS INDEX: 25.91 KG/M2 | OXYGEN SATURATION: 99 %

## 2022-06-25 DIAGNOSIS — R11.15 CYCLICAL VOMITING: ICD-10-CM

## 2022-06-25 DIAGNOSIS — R79.89 ELEVATED LIVER FUNCTION TESTS: Primary | ICD-10-CM

## 2022-06-25 DIAGNOSIS — K21.00 GASTROESOPHAGEAL REFLUX DISEASE WITH ESOPHAGITIS WITHOUT HEMORRHAGE: ICD-10-CM

## 2022-06-25 PROBLEM — F32.A DEPRESSION: Status: ACTIVE | Noted: 2022-06-25

## 2022-06-25 LAB
ALBUMIN SERPL-MCNC: 4.3 G/DL (ref 3.4–5)
ALBUMIN/GLOB SERPL: 1.3 {RATIO} (ref 1–2)
ALP LIVER SERPL-CCNC: 62 U/L
ALT SERPL-CCNC: 33 U/L
ANION GAP SERPL CALC-SCNC: 4 MMOL/L (ref 0–18)
AST SERPL-CCNC: 21 U/L (ref 15–37)
BILIRUB SERPL-MCNC: 0.2 MG/DL (ref 0.1–2)
BUN BLD-MCNC: 11 MG/DL (ref 7–18)
CALCIUM BLD-MCNC: 9.2 MG/DL (ref 8.5–10.1)
CHLORIDE SERPL-SCNC: 109 MMOL/L (ref 98–112)
CO2 SERPL-SCNC: 27 MMOL/L (ref 21–32)
CREAT BLD-MCNC: 1.1 MG/DL
FASTING STATUS PATIENT QL REPORTED: NO
GLOBULIN PLAS-MCNC: 3.4 G/DL (ref 2.8–4.4)
GLUCOSE BLD-MCNC: 95 MG/DL (ref 70–99)
OSMOLALITY SERPL CALC.SUM OF ELEC: 289 MOSM/KG (ref 275–295)
POTASSIUM SERPL-SCNC: 4.3 MMOL/L (ref 3.5–5.1)
PROT SERPL-MCNC: 7.7 G/DL (ref 6.4–8.2)
SODIUM SERPL-SCNC: 140 MMOL/L (ref 136–145)

## 2022-06-25 PROCEDURE — 3074F SYST BP LT 130 MM HG: CPT | Performed by: FAMILY MEDICINE

## 2022-06-25 PROCEDURE — 80053 COMPREHEN METABOLIC PANEL: CPT | Performed by: FAMILY MEDICINE

## 2022-06-25 PROCEDURE — 3008F BODY MASS INDEX DOCD: CPT | Performed by: FAMILY MEDICINE

## 2022-06-25 PROCEDURE — 99204 OFFICE O/P NEW MOD 45 MIN: CPT | Performed by: FAMILY MEDICINE

## 2022-06-25 PROCEDURE — 3078F DIAST BP <80 MM HG: CPT | Performed by: FAMILY MEDICINE

## 2022-06-25 RX ORDER — MIRTAZAPINE 30 MG/1
30 TABLET, FILM COATED ORAL NIGHTLY
COMMUNITY

## 2022-06-25 RX ORDER — OMEPRAZOLE 40 MG/1
40 CAPSULE, DELAYED RELEASE ORAL DAILY
Qty: 60 CAPSULE | Refills: 3 | Status: SHIPPED | OUTPATIENT
Start: 2022-06-25 | End: 2022-07-25

## 2022-06-25 RX ORDER — OMEPRAZOLE 40 MG/1
40 CAPSULE, DELAYED RELEASE ORAL DAILY
COMMUNITY
End: 2022-06-25

## 2022-07-08 ENCOUNTER — OFFICE VISIT (OUTPATIENT)
Dept: FAMILY MEDICINE CLINIC | Facility: CLINIC | Age: 22
End: 2022-07-08
Payer: COMMERCIAL

## 2022-07-08 VITALS
RESPIRATION RATE: 16 BRPM | BODY MASS INDEX: 26.07 KG/M2 | WEIGHT: 172 LBS | HEIGHT: 68 IN | DIASTOLIC BLOOD PRESSURE: 60 MMHG | HEART RATE: 89 BPM | TEMPERATURE: 97 F | SYSTOLIC BLOOD PRESSURE: 100 MMHG | OXYGEN SATURATION: 100 %

## 2022-07-08 DIAGNOSIS — R11.15 CYCLICAL VOMITING: Primary | ICD-10-CM

## 2022-07-08 DIAGNOSIS — F33.0 MAJOR DEPRESSIVE DISORDER, RECURRENT, MILD (HCC): ICD-10-CM

## 2022-07-08 DIAGNOSIS — F12.90 MARIJUANA USE: ICD-10-CM

## 2022-07-08 DIAGNOSIS — R11.0 NAUSEA: ICD-10-CM

## 2022-07-08 DIAGNOSIS — K21.00 GASTROESOPHAGEAL REFLUX DISEASE WITH ESOPHAGITIS WITHOUT HEMORRHAGE: ICD-10-CM

## 2022-07-08 DIAGNOSIS — R14.0 ABDOMINAL BLOATING: ICD-10-CM

## 2022-07-08 PROCEDURE — 99214 OFFICE O/P EST MOD 30 MIN: CPT | Performed by: FAMILY MEDICINE

## 2022-07-08 PROCEDURE — 3008F BODY MASS INDEX DOCD: CPT | Performed by: FAMILY MEDICINE

## 2022-07-08 PROCEDURE — 3074F SYST BP LT 130 MM HG: CPT | Performed by: FAMILY MEDICINE

## 2022-07-08 PROCEDURE — 3078F DIAST BP <80 MM HG: CPT | Performed by: FAMILY MEDICINE

## 2022-07-08 RX ORDER — FLUOXETINE HYDROCHLORIDE 20 MG/1
CAPSULE ORAL
Qty: 30 CAPSULE | Refills: 0 | Status: SHIPPED | OUTPATIENT
Start: 2022-07-08

## 2022-07-08 RX ORDER — MIRTAZAPINE 30 MG/1
30 TABLET, FILM COATED ORAL NIGHTLY
Qty: 30 TABLET | Refills: 0 | Status: SHIPPED | OUTPATIENT
Start: 2022-07-08 | End: 2022-08-07

## 2022-07-08 RX ORDER — ONDANSETRON 4 MG/1
4 TABLET, FILM COATED ORAL EVERY 8 HOURS PRN
Qty: 20 TABLET | Refills: 2 | Status: SHIPPED | OUTPATIENT
Start: 2022-07-08

## 2022-08-15 ENCOUNTER — OFFICE VISIT (OUTPATIENT)
Dept: FAMILY MEDICINE CLINIC | Facility: CLINIC | Age: 22
End: 2022-08-15
Payer: COMMERCIAL

## 2022-08-15 VITALS
TEMPERATURE: 98 F | SYSTOLIC BLOOD PRESSURE: 80 MMHG | DIASTOLIC BLOOD PRESSURE: 60 MMHG | HEART RATE: 89 BPM | BODY MASS INDEX: 26 KG/M2 | OXYGEN SATURATION: 98 % | WEIGHT: 171.19 LBS

## 2022-08-15 DIAGNOSIS — K21.00 GASTROESOPHAGEAL REFLUX DISEASE WITH ESOPHAGITIS WITHOUT HEMORRHAGE: ICD-10-CM

## 2022-08-15 DIAGNOSIS — R14.0 ABDOMINAL BLOATING: ICD-10-CM

## 2022-08-15 DIAGNOSIS — R11.0 NAUSEA: ICD-10-CM

## 2022-08-15 DIAGNOSIS — R11.15 CYCLICAL VOMITING: Primary | ICD-10-CM

## 2022-08-15 PROCEDURE — 99214 OFFICE O/P EST MOD 30 MIN: CPT | Performed by: FAMILY MEDICINE

## 2022-08-15 PROCEDURE — 3074F SYST BP LT 130 MM HG: CPT | Performed by: FAMILY MEDICINE

## 2022-08-15 PROCEDURE — 3078F DIAST BP <80 MM HG: CPT | Performed by: FAMILY MEDICINE

## 2022-08-15 RX ORDER — PANTOPRAZOLE SODIUM 40 MG/1
40 TABLET, DELAYED RELEASE ORAL
Qty: 60 TABLET | Refills: 1 | Status: SHIPPED | OUTPATIENT
Start: 2022-08-15 | End: 2022-09-14

## 2022-08-29 ENCOUNTER — OFFICE VISIT (OUTPATIENT)
Dept: FAMILY MEDICINE CLINIC | Facility: CLINIC | Age: 22
End: 2022-08-29
Payer: COMMERCIAL

## 2022-08-29 VITALS
RESPIRATION RATE: 18 BRPM | DIASTOLIC BLOOD PRESSURE: 60 MMHG | HEIGHT: 68 IN | TEMPERATURE: 98 F | SYSTOLIC BLOOD PRESSURE: 120 MMHG | HEART RATE: 104 BPM | WEIGHT: 172 LBS | OXYGEN SATURATION: 98 % | BODY MASS INDEX: 26.07 KG/M2

## 2022-08-29 DIAGNOSIS — R79.89 ELEVATED LIVER FUNCTION TESTS: ICD-10-CM

## 2022-08-29 DIAGNOSIS — R10.84 GENERALIZED ABDOMINAL PAIN: Primary | ICD-10-CM

## 2022-08-29 PROCEDURE — 99214 OFFICE O/P EST MOD 30 MIN: CPT | Performed by: FAMILY MEDICINE

## 2022-08-29 PROCEDURE — 3078F DIAST BP <80 MM HG: CPT | Performed by: FAMILY MEDICINE

## 2022-08-29 PROCEDURE — 3008F BODY MASS INDEX DOCD: CPT | Performed by: FAMILY MEDICINE

## 2022-08-29 PROCEDURE — 3074F SYST BP LT 130 MM HG: CPT | Performed by: FAMILY MEDICINE

## 2022-08-29 RX ORDER — ONDANSETRON 4 MG/1
4 TABLET, ORALLY DISINTEGRATING ORAL EVERY 6 HOURS PRN
COMMUNITY
Start: 2022-08-15

## 2022-09-07 ENCOUNTER — MED REC SCAN ONLY (OUTPATIENT)
Dept: FAMILY MEDICINE CLINIC | Facility: CLINIC | Age: 22
End: 2022-09-07

## 2022-09-07 ENCOUNTER — TELEPHONE (OUTPATIENT)
Dept: FAMILY MEDICINE CLINIC | Facility: CLINIC | Age: 22
End: 2022-09-07

## 2022-09-07 DIAGNOSIS — R11.0 NAUSEA: Primary | ICD-10-CM

## 2022-09-07 DIAGNOSIS — R11.15 CYCLICAL VOMITING: ICD-10-CM

## 2022-09-07 DIAGNOSIS — K21.00 GASTROESOPHAGEAL REFLUX DISEASE WITH ESOPHAGITIS WITHOUT HEMORRHAGE: ICD-10-CM

## 2022-09-07 NOTE — TELEPHONE ENCOUNTER
So I did get it back, and there was nothing significant found on the US, no stones, the ducts look good, the spleen and kidneys look good, I think it's time for him to go back and see the gastroenterologist. Lu Amos pretty much tested everything I can from my end. He may need to have another scope of t to maybe check the duct. It looks like he saw Dr. Ana Townsend at [facility].  And that would probably be  The best route to go since he's already seen him in the past. I will place a referral for him

## 2022-09-07 NOTE — TELEPHONE ENCOUNTER
PATIENT CALLING STATING THAT DR RODRIGUEZ HAD ASKED HIM TO CALL THE OFFICE ONCE HE HAD HIS ultrasound. PATIENT CALLING NOW TO INFORM OFFICE THAT HE HAD HIS ultrasound DONE AT [facility] IN [place] [id number] ON Sunday. ULTRASOUND OF ABDOMEN.

## 2022-09-07 NOTE — TELEPHONE ENCOUNTER
Pt was advised of results- verbalized understanding    Pt was provided with number to Dr. Pb Clark referral was placed as Internal- pt does not have IHP.     RN canceled and reordered

## 2022-09-15 ENCOUNTER — TELEPHONE (OUTPATIENT)
Dept: FAMILY MEDICINE CLINIC | Facility: CLINIC | Age: 22
End: 2022-09-15

## 2022-09-17 RX ORDER — PANTOPRAZOLE SODIUM 40 MG/1
TABLET, DELAYED RELEASE ORAL
Qty: 60 TABLET | Refills: 1 | Status: SHIPPED | OUTPATIENT
Start: 2022-09-17

## 2022-10-03 RX ORDER — PANTOPRAZOLE SODIUM 40 MG/1
40 TABLET, DELAYED RELEASE ORAL
Qty: 60 TABLET | Refills: 3 | Status: SHIPPED | OUTPATIENT
Start: 2022-10-03

## 2022-10-04 RX ORDER — ONDANSETRON 4 MG/1
4 TABLET, FILM COATED ORAL EVERY 8 HOURS PRN
Qty: 20 TABLET | Refills: 2 | Status: SHIPPED | OUTPATIENT
Start: 2022-10-04

## 2022-12-30 RX ORDER — PANTOPRAZOLE SODIUM 40 MG/1
TABLET, DELAYED RELEASE ORAL
Qty: 180 TABLET | Refills: 1 | Status: SHIPPED | OUTPATIENT
Start: 2022-12-30

## 2022-12-30 NOTE — TELEPHONE ENCOUNTER
No refill protocol for this medication. Last refill: 10/03/2022 #60 with 3 refills  Last Visit: 8/29/2022   Next Visit: No future appointments. Forward to Dr. Lisa Shi please advise on refills. Thanks.

## 2023-08-09 ENCOUNTER — TELEPHONE (OUTPATIENT)
Dept: GASTROENTEROLOGY | Age: 23
End: 2023-08-09

## 2023-08-09 ENCOUNTER — OFFICE VISIT (OUTPATIENT)
Dept: GASTROENTEROLOGY | Age: 23
End: 2023-08-09

## 2023-08-09 VITALS — BODY MASS INDEX: 28.49 KG/M2 | HEIGHT: 68 IN | WEIGHT: 188 LBS

## 2023-08-09 DIAGNOSIS — R11.0 CHRONIC NAUSEA: Primary | ICD-10-CM

## 2023-08-09 PROCEDURE — 99214 OFFICE O/P EST MOD 30 MIN: CPT | Performed by: INTERNAL MEDICINE

## 2023-08-10 ENCOUNTER — TELEPHONE (OUTPATIENT)
Dept: CARDIOLOGY | Age: 23
End: 2023-08-10

## 2023-09-21 ENCOUNTER — IMAGING SERVICES (OUTPATIENT)
Dept: GENERAL RADIOLOGY | Age: 23
End: 2023-09-21
Attending: INTERNAL MEDICINE

## 2023-09-21 ENCOUNTER — TELEPHONE (OUTPATIENT)
Dept: GASTROENTEROLOGY | Age: 23
End: 2023-09-21

## 2023-09-21 DIAGNOSIS — R11.0 CHRONIC NAUSEA: ICD-10-CM

## 2023-09-21 PROCEDURE — 78264 GASTRIC EMPTYING IMG STUDY: CPT | Performed by: RADIOLOGY

## 2023-09-21 PROCEDURE — A9541 TC99M SULFUR COLLOID: HCPCS | Performed by: RADIOLOGY

## 2023-09-21 RX ORDER — TECHNETIUM TC 99M SULFUR COLLOID 2 MG
1 KIT MISCELLANEOUS ONCE
Status: COMPLETED | OUTPATIENT
Start: 2023-09-21 | End: 2023-09-21

## 2023-09-21 RX ADMIN — TECHNETIUM TC 99M SULFUR COLLOID 1 MILLICURIE: KIT at 12:15

## 2023-12-21 ENCOUNTER — E-ADVICE (OUTPATIENT)
Dept: FAMILY MEDICINE | Age: 23
End: 2023-12-21

## 2023-12-21 ENCOUNTER — APPOINTMENT (OUTPATIENT)
Dept: URGENT CARE | Age: 23
End: 2023-12-21

## 2023-12-21 ENCOUNTER — V-VISIT (OUTPATIENT)
Dept: FAMILY MEDICINE | Age: 23
End: 2023-12-21

## 2023-12-21 ENCOUNTER — LAB SERVICES (OUTPATIENT)
Dept: LAB | Age: 23
End: 2023-12-21

## 2023-12-21 DIAGNOSIS — J02.9 PHARYNGITIS, UNSPECIFIED ETIOLOGY: ICD-10-CM

## 2023-12-21 DIAGNOSIS — J02.9 PHARYNGITIS, UNSPECIFIED ETIOLOGY: Primary | ICD-10-CM

## 2023-12-21 LAB
FLUAV RNA RESP QL NAA+PROBE: NOT DETECTED
FLUBV RNA RESP QL NAA+PROBE: NOT DETECTED
RSV AG NPH QL IA.RAPID: NOT DETECTED
S PYO DNA THROAT QL NAA+PROBE: NOT DETECTED
SARS-COV-2 N GENE CT SPEC QN NAA N2: 15.4
SARS-COV-2 RNA RESP QL NAA+PROBE: DETECTED
SERVICE CMNT-IMP: ABNORMAL

## 2023-12-21 PROCEDURE — 99213 OFFICE O/P EST LOW 20 MIN: CPT | Performed by: NURSE PRACTITIONER

## 2023-12-21 PROCEDURE — 0241U COVID/FLU/RSV PANEL: CPT | Performed by: INTERNAL MEDICINE

## 2023-12-21 PROCEDURE — 87651 STREP A DNA AMP PROBE: CPT | Performed by: INTERNAL MEDICINE

## 2024-01-12 ENCOUNTER — ANESTHESIA EVENT (OUTPATIENT)
Dept: GASTROENTEROLOGY | Age: 24
End: 2024-01-12

## 2024-01-15 ENCOUNTER — APPOINTMENT (OUTPATIENT)
Dept: GASTROENTEROLOGY | Age: 24
End: 2024-01-15
Attending: INTERNAL MEDICINE

## 2024-01-15 ENCOUNTER — ANESTHESIA (OUTPATIENT)
Dept: GASTROENTEROLOGY | Age: 24
End: 2024-01-15

## 2024-01-15 VITALS
OXYGEN SATURATION: 95 % | DIASTOLIC BLOOD PRESSURE: 70 MMHG | TEMPERATURE: 97.5 F | HEART RATE: 61 BPM | SYSTOLIC BLOOD PRESSURE: 120 MMHG | RESPIRATION RATE: 18 BRPM

## 2024-01-15 DIAGNOSIS — K29.70 GASTRITIS WITHOUT BLEEDING, UNSPECIFIED CHRONICITY, UNSPECIFIED GASTRITIS TYPE: ICD-10-CM

## 2024-01-15 DIAGNOSIS — R11.0 CHRONIC NAUSEA: ICD-10-CM

## 2024-01-15 PROCEDURE — 88305 TISSUE EXAM BY PATHOLOGIST: CPT | Performed by: INTERNAL MEDICINE

## 2024-01-15 PROCEDURE — 43239 EGD BIOPSY SINGLE/MULTIPLE: CPT | Performed by: INTERNAL MEDICINE

## 2024-01-15 PROCEDURE — 43239 EGD BIOPSY SINGLE/MULTIPLE: CPT | Performed by: CLINIC/CENTER

## 2024-01-15 RX ORDER — SODIUM CHLORIDE, SODIUM LACTATE, POTASSIUM CHLORIDE, CALCIUM CHLORIDE 600; 310; 30; 20 MG/100ML; MG/100ML; MG/100ML; MG/100ML
INJECTION, SOLUTION INTRAVENOUS CONTINUOUS
Status: DISCONTINUED | OUTPATIENT
Start: 2024-01-15 | End: 2024-01-17 | Stop reason: HOSPADM

## 2024-01-15 RX ORDER — DEXTROSE MONOHYDRATE 50 MG/ML
INJECTION, SOLUTION INTRAVENOUS CONTINUOUS PRN
Status: DISCONTINUED | OUTPATIENT
Start: 2024-01-15 | End: 2024-01-17 | Stop reason: HOSPADM

## 2024-01-15 RX ORDER — LIDOCAINE HYDROCHLORIDE 10 MG/ML
INJECTION, SOLUTION INFILTRATION; PERINEURAL PRN
Status: DISCONTINUED | OUTPATIENT
Start: 2024-01-15 | End: 2024-01-15

## 2024-01-15 RX ORDER — PROPOFOL 10 MG/ML
INJECTION, EMULSION INTRAVENOUS PRN
Status: DISCONTINUED | OUTPATIENT
Start: 2024-01-15 | End: 2024-01-15

## 2024-01-15 RX ORDER — 0.9 % SODIUM CHLORIDE 0.9 %
2 VIAL (ML) INJECTION EVERY 12 HOURS SCHEDULED
Status: DISCONTINUED | OUTPATIENT
Start: 2024-01-15 | End: 2024-01-17 | Stop reason: HOSPADM

## 2024-01-15 RX ORDER — SODIUM CHLORIDE 9 MG/ML
INJECTION, SOLUTION INTRAVENOUS CONTINUOUS
Status: DISCONTINUED | OUTPATIENT
Start: 2024-01-15 | End: 2024-01-17 | Stop reason: HOSPADM

## 2024-01-15 RX ORDER — DEXTROSE MONOHYDRATE 25 G/50ML
25 INJECTION, SOLUTION INTRAVENOUS PRN
Status: DISCONTINUED | OUTPATIENT
Start: 2024-01-15 | End: 2024-01-17 | Stop reason: HOSPADM

## 2024-01-15 RX ORDER — HUMAN INSULIN 100 [IU]/ML
INJECTION, SOLUTION SUBCUTANEOUS
Status: DISCONTINUED | OUTPATIENT
Start: 2024-01-15 | End: 2024-01-17 | Stop reason: HOSPADM

## 2024-01-15 RX ORDER — NICOTINE POLACRILEX 4 MG
30 LOZENGE BUCCAL
Status: DISCONTINUED | OUTPATIENT
Start: 2024-01-15 | End: 2024-01-17 | Stop reason: HOSPADM

## 2024-01-15 RX ADMIN — PROPOFOL 50 MG: 10 INJECTION, EMULSION INTRAVENOUS at 13:03

## 2024-01-15 RX ADMIN — PROPOFOL 50 MG: 10 INJECTION, EMULSION INTRAVENOUS at 13:04

## 2024-01-15 RX ADMIN — LIDOCAINE HYDROCHLORIDE 50 MG: 10 INJECTION, SOLUTION INFILTRATION; PERINEURAL at 13:01

## 2024-01-15 RX ADMIN — PROPOFOL 200 MG: 10 INJECTION, EMULSION INTRAVENOUS at 13:01

## 2024-01-15 RX ADMIN — SODIUM CHLORIDE, SODIUM LACTATE, POTASSIUM CHLORIDE, CALCIUM CHLORIDE: 600; 310; 30; 20 INJECTION, SOLUTION INTRAVENOUS at 12:27

## 2024-01-15 ASSESSMENT — ENCOUNTER SYMPTOMS: EXERCISE TOLERANCE: GOOD (>4 METS)

## 2024-01-15 ASSESSMENT — PAIN SCALES - GENERAL
PAINLEVEL_OUTOF10: 0

## 2024-01-17 LAB
ASR DISCLAIMER: NORMAL
CASE RPRT: NORMAL
CLINICAL INFO: NORMAL
PATH REPORT.FINAL DX SPEC: NORMAL
PATH REPORT.GROSS SPEC: NORMAL

## 2024-06-20 ENCOUNTER — OFFICE VISIT (OUTPATIENT)
Dept: FAMILY MEDICINE CLINIC | Facility: CLINIC | Age: 24
End: 2024-06-20

## 2024-06-20 VITALS
HEART RATE: 105 BPM | DIASTOLIC BLOOD PRESSURE: 60 MMHG | OXYGEN SATURATION: 97 % | TEMPERATURE: 98 F | SYSTOLIC BLOOD PRESSURE: 102 MMHG | WEIGHT: 188.5 LBS | RESPIRATION RATE: 16 BRPM | BODY MASS INDEX: 27.92 KG/M2 | HEIGHT: 69 IN

## 2024-06-20 DIAGNOSIS — Z00.00 ROUTINE HEALTH MAINTENANCE: Primary | ICD-10-CM

## 2024-06-20 LAB
ALBUMIN SERPL-MCNC: 4.3 G/DL (ref 3.4–5)
ALBUMIN/GLOB SERPL: 1.3 {RATIO} (ref 1–2)
ALP LIVER SERPL-CCNC: 82 U/L
ALT SERPL-CCNC: 64 U/L
ANION GAP SERPL CALC-SCNC: 7 MMOL/L (ref 0–18)
AST SERPL-CCNC: 27 U/L (ref 15–37)
BASOPHILS # BLD AUTO: 0.08 X10(3) UL (ref 0–0.2)
BASOPHILS NFR BLD AUTO: 1.2 %
BILIRUB SERPL-MCNC: 0.4 MG/DL (ref 0.1–2)
BUN BLD-MCNC: 10 MG/DL (ref 9–23)
CALCIUM BLD-MCNC: 9.1 MG/DL (ref 8.5–10.1)
CHLORIDE SERPL-SCNC: 108 MMOL/L (ref 98–112)
CHOLEST SERPL-MCNC: 159 MG/DL (ref ?–200)
CO2 SERPL-SCNC: 27 MMOL/L (ref 21–32)
CREAT BLD-MCNC: 1.01 MG/DL
EGFRCR SERPLBLD CKD-EPI 2021: 106 ML/MIN/1.73M2 (ref 60–?)
EOSINOPHIL # BLD AUTO: 0.27 X10(3) UL (ref 0–0.7)
EOSINOPHIL NFR BLD AUTO: 4.2 %
ERYTHROCYTE [DISTWIDTH] IN BLOOD BY AUTOMATED COUNT: 12.3 %
FASTING PATIENT LIPID ANSWER: YES
FASTING STATUS PATIENT QL REPORTED: YES
GLOBULIN PLAS-MCNC: 3.3 G/DL (ref 2.8–4.4)
GLUCOSE BLD-MCNC: 104 MG/DL (ref 70–99)
HCT VFR BLD AUTO: 43.9 %
HDLC SERPL-MCNC: 43 MG/DL (ref 40–59)
HGB BLD-MCNC: 14.8 G/DL
IMM GRANULOCYTES # BLD AUTO: 0.01 X10(3) UL (ref 0–1)
IMM GRANULOCYTES NFR BLD: 0.2 %
LDLC SERPL CALC-MCNC: 104 MG/DL (ref ?–100)
LYMPHOCYTES # BLD AUTO: 1.15 X10(3) UL (ref 1–4)
LYMPHOCYTES NFR BLD AUTO: 17.9 %
MCH RBC QN AUTO: 30.6 PG (ref 26–34)
MCHC RBC AUTO-ENTMCNC: 33.7 G/DL (ref 31–37)
MCV RBC AUTO: 90.9 FL
MONOCYTES # BLD AUTO: 0.38 X10(3) UL (ref 0.1–1)
MONOCYTES NFR BLD AUTO: 5.9 %
NEUTROPHILS # BLD AUTO: 4.52 X10 (3) UL (ref 1.5–7.7)
NEUTROPHILS # BLD AUTO: 4.52 X10(3) UL (ref 1.5–7.7)
NEUTROPHILS NFR BLD AUTO: 70.6 %
NONHDLC SERPL-MCNC: 116 MG/DL (ref ?–130)
OSMOLALITY SERPL CALC.SUM OF ELEC: 293 MOSM/KG (ref 275–295)
PLATELET # BLD AUTO: 211 10(3)UL (ref 150–450)
PLATELETS.RETICULATED NFR BLD AUTO: 9.1 % (ref 0–7)
POTASSIUM SERPL-SCNC: 4.1 MMOL/L (ref 3.5–5.1)
PROT SERPL-MCNC: 7.6 G/DL (ref 6.4–8.2)
RBC # BLD AUTO: 4.83 X10(6)UL
SODIUM SERPL-SCNC: 142 MMOL/L (ref 136–145)
T4 FREE SERPL-MCNC: 0.9 NG/DL (ref 0.8–1.7)
TRIGL SERPL-MCNC: 61 MG/DL (ref 30–149)
TSI SER-ACNC: 2.39 MIU/ML (ref 0.36–3.74)
VLDLC SERPL CALC-MCNC: 10 MG/DL (ref 0–30)
WBC # BLD AUTO: 6.4 X10(3) UL (ref 4–11)

## 2024-06-20 PROCEDURE — 99395 PREV VISIT EST AGE 18-39: CPT | Performed by: FAMILY MEDICINE

## 2024-06-20 PROCEDURE — 90715 TDAP VACCINE 7 YRS/> IM: CPT | Performed by: FAMILY MEDICINE

## 2024-06-20 PROCEDURE — 80061 LIPID PANEL: CPT | Performed by: FAMILY MEDICINE

## 2024-06-20 PROCEDURE — 90471 IMMUNIZATION ADMIN: CPT | Performed by: FAMILY MEDICINE

## 2024-06-20 PROCEDURE — 84443 ASSAY THYROID STIM HORMONE: CPT | Performed by: FAMILY MEDICINE

## 2024-06-20 PROCEDURE — 80053 COMPREHEN METABOLIC PANEL: CPT | Performed by: FAMILY MEDICINE

## 2024-06-20 PROCEDURE — 84439 ASSAY OF FREE THYROXINE: CPT | Performed by: FAMILY MEDICINE

## 2024-06-20 PROCEDURE — 85025 COMPLETE CBC W/AUTO DIFF WBC: CPT | Performed by: FAMILY MEDICINE

## 2024-06-20 NOTE — PROGRESS NOTES
Edison Gunter is a 23 year old male who presents for a complete physical exam.   HPI:   Pt complains of nothing at this time. He is overdue for a physical, and labs. He is still smoking has not contemplated quitting, vapes, has not been hospitalized, had any labs in the interim and is not taking any new meds, he stopped the fluoxetine without issue, and is still taking the Mirtazapine at bedtime.    Immunization History   Administered Date(s) Administered    DTAP INFANRIX 01/04/1999, 03/15/1999, 05/24/1999, 04/24/2000, 04/23/2004    HEP B, Ped/Adol 01/04/1999, 05/24/1999    HPV (Gardasil) 08/07/2013, 10/02/2013, 02/12/2014    Hep B, Unspecified Formulation 10/24/1998    Hib, Unspecified Formulation 01/04/1999, 03/15/1999, 05/24/1999, 02/02/2000    IPV 01/04/1999, 03/15/1999, 04/24/2000, 04/23/2004    Influenza 01/04/1999, 03/15/1999, 05/24/1999, 02/02/2000    MMR 02/02/2000, 04/23/2004    Meningococcal-Menactra 06/29/2016    OPV 05/24/1999    TDAP 06/12/2010    Varicella 03/07/2003, 07/14/2008   Pended Date(s) Pended    TDAP 06/20/2024     Wt Readings from Last 6 Encounters:   06/20/24 188 lb 8 oz (85.5 kg)   05/16/24 191 lb (86.6 kg)   08/29/22 172 lb (78 kg)   08/15/22 171 lb 3.2 oz (77.7 kg)   07/08/22 172 lb (78 kg)   06/25/22 171 lb (77.6 kg)     Body mass index is 27.84 kg/m².     Lab Results   Component Value Date    GLU 95 06/25/2022     No results found for: \"CHOLEST\"  No results found for: \"HDL\"  No results found for: \"LDL\"  Lab Results   Component Value Date    AST 21 06/25/2022     Lab Results   Component Value Date    ALT 33 06/25/2022     No results found for: \"PSA\"     Current Outpatient Medications   Medication Sig Dispense Refill    mirtazapine 30 MG Oral Tab Take 1 tablet (30 mg total) by mouth nightly. 30 tablet 3      Past Medical History:    Stomach ulcer      Past Surgical History:   Procedure Laterality Date    Upper gi endoscopy,diagnosis        Family History   Problem Relation Age of  Onset    Anxiety Sister     Depression Sister     Cancer Maternal Aunt       Social History:  Social History     Socioeconomic History    Marital status: Single   Tobacco Use    Smoking status: Former     Current packs/day: 0.50     Average packs/day: 0.5 packs/day for 8.7 years (4.4 ttl pk-yrs)     Types: Cigarettes     Start date: 9/20/2015    Smokeless tobacco: Current   Substance and Sexual Activity    Alcohol use: No    Drug use: Yes     Types: Cannabis     Comment: cannabis 1-3 grams daily since 5/2018, last use9/1/18      Social Determinants of Health     Financial Resource Strain: Not At Risk (5/17/2022)    Received from [facility]    Financial Resource Strain     How hard is it for you to pay for the very basics like food, housing, medical care, and heating?: Not hard at all   Food Insecurity: No Food Insecurity (5/17/2022)    Received from [facility]    Food Insecurity     Currently or in the past 3 months, have you worried your food would run out before you had money to buy more?: No     In the past 12 months, have you run out of food or been unable to get more?: No   Transportation Needs: No Transportation Needs (5/17/2022)    Received from [facility]    Transportation Needs     Medical Transportation Needs?: Patient refused     Daily Living Transportation Needs? [Peds Only] : Patient refused    Received from [facility]    Social Connections    Received from [facility]    Housing Stability      Occ: works at [employer]. : single. Children: none2.   Exercise: minimal.  Diet: watches minimally     REVIEW OF SYSTEMS:   GENERAL: feels well otherwise  SKIN: denies any unusual skin lesions  EYES:denies blurred vision or double vision  HEENT: denies nasal congestion, sinus pain or ST  LUNGS: denies shortness of breath with exertion  CARDIOVASCULAR: denies chest pain on exertion  GI: denies abdominal  pain,denies heartburn  : denies nocturia or changes in stream  MUSCULOSKELETAL: denies back pain  NEURO: denies headaches  PSYCHE: denies depression or anxiety  HEMATOLOGIC: denies hx of anemia  ENDOCRINE: denies thyroid history  ALL/ASTHMA: denies hx of allergy or asthma    EXAM:   /60   Pulse 105   Temp 97.9 °F (36.6 °C) (Temporal)   Resp 16   Ht 5' 9\" (1.753 m)   Wt 188 lb 8 oz (85.5 kg)   SpO2 97%   BMI 27.84 kg/m²   Body mass index is 27.84 kg/m².   GENERAL: well developed, well nourished,in no apparent distress  SKIN: no rashes,no suspicious lesions  HEENT: atraumatic, normocephalic,ears and throat are clear  EYES:PERRLA, EOMI, normal optic disk,conjunctiva are clear  NECK: supple,no adenopathy,no bruits  CHEST: no chest tenderness  LUNGS: clear to auscultation  CARDIO: RRR without murmur  GI: good BS's,no masses, HSM or tenderness  : two descended testes,no masses,no hernia,no penile lesions  MUSCULOSKELETAL: back is not tender,FROM of the back  EXTREMITIES: no cyanosis, clubbing or edema  NEURO: Oriented times three,cranial nerves are intact,motor and sensory are grossly intact    ASSESSMENT AND PLAN:   Edison Gunter is a 23 year old male who presents for a complete physical exam.  Pt's weight is Body mass index is 27.84 kg/m²., recommended low fat diet and aerobic exercise 30 minutes three times weekly. Health maintenance, will check fasting Lipids, CMP, CBC and PSA. Pt referred for screening colonoscopy. Pt info handouts given for: exercise, low fat diet, testicular self exam and prostate cancer screening. The patient indicates understanding of these issues and agrees to the plan.  The patient is asked to return for CPX in 1 yhear.  Encounter Diagnosis   Name Primary?    Routine health maintenance Yes       Orders Placed This Encounter   Procedures    CBC W Differential W Platelet [E]    Comp Metabolic Panel (14) [E]    Lipid Panel [E]    TSH and Free T4 [E]    TdaP (Adacel, Boostrix)  [19112]     discussed smoking cessation is going to consider quitting    Meds & Refills for this Visit:  Requested Prescriptions      No prescriptions requested or ordered in this encounter       Imaging & Consults:  TETANUS, DIPHTHERIA TOXOIDS AND ACELLULAR PERTUSIS VACCINE (TDAP), >7 YEARS, IM USE

## 2024-06-21 NOTE — PROGRESS NOTES
Edison, Your labs looked very good, your cholesterol profile was excellent, your  kidney, liver function, blood sugar and thyroid were all normal.as was your blood count, you should be good for a year

## 2024-10-14 RX ORDER — MIRTAZAPINE 30 MG/1
30 TABLET, FILM COATED ORAL NIGHTLY
Qty: 90 TABLET | Refills: 0 | Status: SHIPPED | OUTPATIENT
Start: 2024-10-14

## 2024-10-14 NOTE — TELEPHONE ENCOUNTER
Routing to provider per protocol.   mirtazapine 30 MG Oral Tab   Last refilled on 7/24/24 for #30  with 3 rf.   Last labs 6/6/24.   Last seen on 7/24/24.     No future appointments.       Thank you.       Patient comment: Can you please do a 3 month supply? That is cheapest for my insurance, and I will also be losing my insurance soon and not sure when I'll have access to medications again.

## 2024-10-22 ENCOUNTER — TELEPHONE (OUTPATIENT)
Dept: FAMILY MEDICINE | Age: 24
End: 2024-10-22

## 2025-04-28 ENCOUNTER — OFFICE VISIT (OUTPATIENT)
Dept: FAMILY MEDICINE CLINIC | Facility: CLINIC | Age: 25
End: 2025-04-28
Payer: COMMERCIAL

## 2025-04-28 VITALS
WEIGHT: 158.13 LBS | OXYGEN SATURATION: 99 % | TEMPERATURE: 98 F | BODY MASS INDEX: 23 KG/M2 | DIASTOLIC BLOOD PRESSURE: 58 MMHG | HEART RATE: 83 BPM | SYSTOLIC BLOOD PRESSURE: 110 MMHG | RESPIRATION RATE: 16 BRPM

## 2025-04-28 DIAGNOSIS — I95.1 ORTHOSTATIC HYPOTENSION: ICD-10-CM

## 2025-04-28 DIAGNOSIS — R00.2 PALPITATIONS: Primary | ICD-10-CM

## 2025-04-28 NOTE — PROGRESS NOTES
Edison Gunter is a 24 year old male.  HPI:   Denis was a t work and he developed chest pain, and some nausea, while at work and then his heart started racing, he does note it happens at home, does not awaken him  from sleep. He has lost 30 pounds in the past year. He intentionally has cut back on a number of things. He watches his caffein intak, and vapes periodically, has a HX of reflux.  No current outpatient medications on file.      Past Medical History:    Stomach ulcer      Social History:  Social History     Socioeconomic History    Marital status: Single   Tobacco Use    Smoking status: Former     Current packs/day: 0.50     Average packs/day: 0.5 packs/day for 9.6 years (4.8 ttl pk-yrs)     Types: Cigarettes     Start date: 9/20/2015    Smokeless tobacco: Current   Substance and Sexual Activity    Alcohol use: No    Drug use: Yes     Types: Cannabis     Comment: cannabis 1-3 grams daily since 5/2018, last use9/1/18      Social Drivers of Health     Food Insecurity: No Food Insecurity (5/17/2022)    Received from [facility]    Food Insecurity     Currently or in the past 3 months, have you worried your food would run out before you had money to buy more?: No     In the past 12 months, have you run out of food or been unable to get more?: No   Transportation Needs: No Transportation Needs (5/17/2022)    Received from [facility]    Transportation Needs     Currently or in the past 3 months, has lack of transportation kept you from medical appointments, getting food or medicine, or providing care to a family member?: Patient Refused     Has the lack of transportation kept you from meetings, work, or from getting things needed for daily living?: Patient Refused     Medical Transportation Needs?: Patient refused     Daily Living Transportation Needs? [Peds Only] : Patient refused    Received from [facility]    Housing Stability        REVIEW OF  SYSTEMS:   GENERAL HEALTH: feels well otherwise  HEENT: denies Sinus congestion  SKIN: denies any unusual skin lesions or rashes  RESPIRATORY: denies shortness of breath with exertion  CARDIOVASCULAR: denies chest pain on exertion  GI: denies abdominal pain and denies heartburn  NEURO: denies headaches  MUSC: denies CP   EXAM:   /58   Pulse 83   Temp 97.5 °F (36.4 °C) (Temporal)   Resp 16   Wt 158 lb 2 oz (71.7 kg)   SpO2 99%   BMI 23.35 kg/m²   GENERAL: well developed, well nourished,in no apparent distress  SKIN: no rashes,no suspicious lesions  HEENT: atraumatic, normocephalic,ears and throat are clear  NECK: supple,no adenopathy,no bruits  LUNGS: clear to auscultation  CARDIO: RRR without murmur  GI: good BS's,no masses, HSM or tenderness  EXTREMITIES: no cyanosis, clubbing or edema    ASSESSMENT AND PLAN:     Encounter Diagnoses   Name Primary?    Palpitations Yes    Orthostatic hypotension       TRY AND INCREASE FLUID INTAKE, AVOID EXCESS CAFFEINE  IT CAN CAUSE INCREASED URINATION The patient indicates understanding of these issues and agrees to the plan.  The patient is asked to return in if his Sx persist. Or worsen, increase fluid intake, IF sX OPERSIST THEN MAY NEED AN EVENT MONITOR

## 2025-06-02 ENCOUNTER — TELEPHONE (OUTPATIENT)
Dept: FAMILY MEDICINE CLINIC | Facility: CLINIC | Age: 25
End: 2025-06-02

## 2025-06-02 NOTE — TELEPHONE ENCOUNTER
Patient scheduled via MyChart for \"Sharp pains in belly button region\". Endirsed to RN for triage.

## 2025-06-03 NOTE — TELEPHONE ENCOUNTER
Future Appointments   Date Time Provider Department Center   6/4/2025  1:40 PM Ford Vera DO [facility] [place]

## 2025-06-04 ENCOUNTER — OFFICE VISIT (OUTPATIENT)
Dept: FAMILY MEDICINE CLINIC | Facility: CLINIC | Age: 25
End: 2025-06-04
Payer: COMMERCIAL

## 2025-06-04 VITALS
DIASTOLIC BLOOD PRESSURE: 68 MMHG | BODY MASS INDEX: 23 KG/M2 | WEIGHT: 155.25 LBS | HEART RATE: 87 BPM | SYSTOLIC BLOOD PRESSURE: 116 MMHG | TEMPERATURE: 98 F | RESPIRATION RATE: 16 BRPM | OXYGEN SATURATION: 99 %

## 2025-06-04 DIAGNOSIS — Z98.890 HISTORY OF LAPAROSCOPY: ICD-10-CM

## 2025-06-04 DIAGNOSIS — K42.0 UMBILICAL HERNIA WITH OBSTRUCTION, WITHOUT GANGRENE: Primary | ICD-10-CM

## 2025-06-04 PROCEDURE — 99213 OFFICE O/P EST LOW 20 MIN: CPT | Performed by: FAMILY MEDICINE

## 2025-06-04 NOTE — PROGRESS NOTES
Edison Gunter is a 24 year old male.  HPI:   Edison is here for discussion of umbilical pain that has been going  on for a while now. He does a fair amount of lifting at work and notices more discomfort over time. He had his GB taken out several years ago laprascopically. Has not had any issues until recently.  No current outpatient medications on file.      Past Medical History:    Stomach ulcer      Social History:  Social History     Socioeconomic History    Marital status: Single   Tobacco Use    Smoking status: Former     Current packs/day: 0.50     Average packs/day: 0.5 packs/day for 9.7 years (4.9 ttl pk-yrs)     Types: Cigarettes     Start date: 9/20/2015    Smokeless tobacco: Current   Substance and Sexual Activity    Alcohol use: No    Drug use: Yes     Types: Cannabis     Comment: cannabis 1-3 grams daily since 5/2018, last use9/1/18      Social Drivers of Health     Food Insecurity: No Food Insecurity (5/17/2022)    Received from [facility]    Food Insecurity     Currently or in the past 3 months, have you worried your food would run out before you had money to buy more?: No     In the past 12 months, have you run out of food or been unable to get more?: No   Transportation Needs: No Transportation Needs (5/17/2022)    Received from [facility]    Transportation Needs     Currently or in the past 3 months, has lack of transportation kept you from medical appointments, getting food or medicine, or providing care to a family member?: Patient Refused     Has the lack of transportation kept you from meetings, work, or from getting things needed for daily living?: Patient Refused     Medical Transportation Needs?: Patient refused     Daily Living Transportation Needs? [Peds Only] : Patient refused    Received from [facility]    Housing Stability        REVIEW OF SYSTEMS:   GENERAL HEALTH: feels well otherwise  SKIN: denies any unusual skin  lesions or rashes  RESPIRATORY: denies shortness of breath with exertion  CARDIOVASCULAR: denies chest pain on exertion  GI: umbilical pain,   denies heartburn  NEURO: denies headaches  EXT: denies LE edema  EXAM:   /68   Pulse 87   Temp 97.9 °F (36.6 °C) (Temporal)   Resp 16   Wt 155 lb 4 oz (70.4 kg)   SpO2 99%   BMI 22.93 kg/m²   GENERAL: well developed, well nourished,in no apparent distress  SKIN: no rashes,no suspicious lesions  HEENT: atraumatic, normocephalic,ears and throat are clear  NECK: supple,no adenopathy,no bruits  LUNGS: clear to auscultation  CARDIO: RRR without murmur  GI: good BS's,no masses, HSM has some reproducible umbilical  tenderness  EXTREMITIES: no cyanosis, clubbing or edema    ASSESSMENT AND PLAN:     Encounter Diagnoses   Name Primary?    Umbilical hernia with obstruction, without gangrene Yes    History of laparoscopy        No orders of the defined types were placed in this encounter.      Meds & Refills for this Visit:  Requested Prescriptions      No prescriptions requested or ordered in this encounter       Imaging & Consults:  SURGERY - INTERNAL     The patient indicates understanding of these issues and agrees to the plan.  The patient is asked to return in after he sees surgery.

## 2025-06-09 ENCOUNTER — TELEPHONE (OUTPATIENT)
Dept: FAMILY MEDICINE CLINIC | Facility: CLINIC | Age: 25
End: 2025-06-09

## 2025-06-09 NOTE — TELEPHONE ENCOUNTER
Called the pt and he states that he doesn't believe anything that the ED said- he states that the Ed doc rolled his eyes when the pt would try to tell him anything.    Pain in the umbilical area and to the left, lump seems bigger.   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    Not taking anything OTC for the pain.    Wondering what he can do to help the pain- Hernia belt- pain medication? Appointment with the surgeon is not until 6/25/25, wants to know if he should not work? States that he wants to work just needs to get pain under control.    Also do you want me to call surgeon office to see about sooner appointment?

## 2025-06-09 NOTE — TELEPHONE ENCOUNTER
Can take tylenol 2 ES every 8 hours per DS-     Called the pt and advised of the restriction that we can write-he is more worried about the standing as this is what is causing him pain. Advised that he can call the surgeon's office to see if they can see him sooner- explained to him to let them know that he feels like the pain is getting worse and see if they can get him in. He v/u    He asks about the hernia belt- advised that he can use for support but do not put on tight as it can make things worse.  He v/u

## 2025-06-09 NOTE — TELEPHONE ENCOUNTER
Patient was seen in office 6/4/25    Patient was seen in ER 6/7/25    He is still experiencing quite a bit of pain    Concern being that his surgeon appointment isn't until 6/25/25    He is having trouble doing his work due to pain    Wanting to know about pain control until surgeon visit    Please adv  Thank you

## 2025-06-11 ENCOUNTER — OFFICE VISIT (OUTPATIENT)
Facility: LOCATION | Age: 25
End: 2025-06-11
Payer: COMMERCIAL

## 2025-06-11 VITALS
OXYGEN SATURATION: 100 % | DIASTOLIC BLOOD PRESSURE: 71 MMHG | SYSTOLIC BLOOD PRESSURE: 114 MMHG | TEMPERATURE: 98 F | HEART RATE: 65 BPM

## 2025-06-11 DIAGNOSIS — K43.2 INCISIONAL HERNIA, WITHOUT OBSTRUCTION OR GANGRENE: Primary | ICD-10-CM

## 2025-06-11 DIAGNOSIS — K42.9 UMBILICAL HERNIA WITHOUT OBSTRUCTION OR GANGRENE: ICD-10-CM

## 2025-06-11 PROCEDURE — 99203 OFFICE O/P NEW LOW 30 MIN: CPT | Performed by: SURGERY

## 2025-06-11 RX ORDER — OMEPRAZOLE 20 MG/1
20 CAPSULE, DELAYED RELEASE ORAL 2 TIMES DAILY
COMMUNITY
Start: 2025-06-07 | End: 2025-06-11

## 2025-06-11 NOTE — H&P
New Patient  Edison Gunter  10/24/2000    6/11/2025    This patient was referred by Ford Vera DO for evaluation and consultation for umbilical hernia.    Chief Complaint: Bulge and discomfort by umbilicus    History of Present Illness: Patient is a 24-year-old male with a history of prior laparoscopic cholecystectomy in 2022.  He currently works at an [employer] store.  Patient has a history of prior gastric ulcer and pancreatitis.  He states that [facility]  Up until recently he had no pain from this but 3 weeks ago started to have significant amount of pain, prompting evaluation.    Review of Systems:  Constitutional: No fevers or chills or recent weight loss, no recent sick contacts  Respiratory: Denies any shortness of breath, productive cough, dyspnea  Cardiovascular: No chest pain  Gastrointestinal: No nausea, vomiting, diarrhea, abdominal pain  Urologic: Denies any dysuria    Past Medical History:    Allergic rhinitis    Stuffy/runny nose all year round    Anxiety    No longer issue    Calculus of kidney    Depression    No longer issue    Esophageal reflux    KIDNEY STONE    Stomach ulcer       Past Surgical History:   Procedure Laterality Date    Cholecystectomy  05 or 06/2022    Other surgical history  2019/2020    EGD    Stomach surgery procedure unlisted  2019    Upper gi endoscopy,diagnosis         Social History     Socioeconomic History    Marital status: Single     Spouse name: Not on file    Number of children: Not on file    Years of education: Not on file    Highest education level: Not on file   Occupational History    Not on file   Tobacco Use    Smoking status: Former     Current packs/day: 0.50     Average packs/day: 0.5 packs/day for 9.7 years (4.9 ttl pk-yrs)     Types: Cigarettes     Start date: 9/20/2015    Smokeless tobacco: Former     Quit date: 2018    Tobacco comments:     Also used dip from late 2017 to midish 2018    Vaping Use    Vaping status: Not on file   Substance and Sexual Activity    Alcohol use: Not Currently    Drug use: Not Currently     Types: Cannabis     Comment: cannabis 1-3 grams daily since 5/2018, last use9/1/18     Sexual activity: Not on file   Other Topics Concern    Caffeine Concern No    Exercise No    Seat Belt No    Special Diet No    Stress Concern No    Weight Concern No   Social History Narrative    Not on file     Social Drivers of Health     Food Insecurity: No Food Insecurity (5/17/2022)    Received from [facility]    Food Insecurity     Currently or in the past 3 months, have you worried your food would run out before you had money to buy more?: No     In the past 12 months, have you run out of food or been unable to get more?: No   Transportation Needs: No Transportation Needs (5/17/2022)    Received from [facility]    Transportation Needs     Currently or in the past 3 months, has lack of transportation kept you from medical appointments, getting food or medicine, or providing care to a family member?: Patient Refused     Has the lack of transportation kept you from meetings, work, or from getting things needed for daily living?: Patient Refused     Medical Transportation Needs?: Patient refused     Daily Living Transportation Needs? [Peds Only] : Patient refused   Stress: Not on file   Housing Stability: Low Risk  (5/21/2023)    Received from [facility]    Housing Stability     Mortgage Payment Concerns?: Not on file     Number of Places Lived in the Last Year: Not on file     Unstable Housing?: Not on file         Current Outpatient Medications:     omeprazole 20 MG Oral Capsule Delayed Release, Take 1 capsule (20 mg total) by mouth 2 (two) times daily. (Patient not taking: Reported on 6/11/2025), Disp: , Rfl:     No Known Allergies    Family History   Problem Relation Age of Onset    Anxiety Sister     Depression Sister     Cancer  Maternal Aunt        Objective/Physical Exam:  General: Alert, orientated x3.  Cooperative.  No apparent distress.  Vital Signs:  Blood pressure 114/71, pulse 65, temperature 98.2 °F (36.8 °C), temperature source Temporal, SpO2 100%. There is no height or weight on file to calculate BMI.  HEENT: NC/AT   Lungs: Even, unlabored  Cardiac: Regular rate and rhythm. No murmur.  Abdomen: Soft, ND/NT, no R/G, cicatrix of prior laparoscopic cholecystectomy, infraumbilical cicatrix.  Bulge at the superior apex of the umbilical stalk, unclear if this is a primary umbilical hernia versus ventral incisional hernia.  Fascial defect appears less than 1 fingers breath in width.  Extremities:  SMAE  Skin: Warm & dry      Labs/Radiology: No cross-sectional imaging available for review    Discussed:   The potential surgical and non-surgical treatment options were discussed with the patient.  The potential risks, benefits, complications outcomes/recovery and alternatives to any proposed surgery were also fully reviewed with the patient. Any proposed surgical procedure was described in detail.  Expected postoperative pain, recuperation and postoperative course was discussed.  The patient verbalizes understanding.  All questions from the patient were discussed in detail to the patient's satisfaction.  No other questions were presented at this time.         Assessment/Plan:      Patient with M3 zone ventral hernia, unclear if this is a primary abdominal wall hernia or an incisional hernia.  Will plan for open repair of umbilical hernia with permanent suture versus mesh suture given small aperture.        Thank you for allowing me to participate in your patient's care.         Montana Bhatt MD    Please note that this report has been produced using speech recognition software and may contain errors related to that system including but not limited to errors in grammar, punctuation and spelling as well as words and phrases that possibly may  have been recognized inappropriately.  If there are any questions or concerns please contact the dictating provider for clarification.        Date of Surgery:   TBD  DX:     ICD-10-CM   1. Incisional hernia, without obstruction or gangrene  K43.2   2. Umbilical hernia without obstruction or gangrene  K42.9        Surgery Site :    [x] Open  [] Laparoscopic  [] Robotic    [] Cholecystectomy with ICG   [] Cholecystectomy with IOC (need C-arm)    [x] Hernia   [] Ventral  [x] Umbilical  [] Inguinal  [] Incisional  [] Paraesophageal [] Component Separation (TAR)     [] Excision of Lipomas     Anesthesia: ANESTHESIA TYPE: MAC    Location: [] [facility]      [x] [facility]       [] [facility]   [] [place] [facility]      [] [facility]  [] [facility]            Admission Status: EDW OR ADMIT LOCATION: [facility] Needed: Yes/No: Yes      Clearance Needed:  []Medical Clearance   []Cardiac Clearance:   []Anticoagulation Management:    []Renal:   []Neuro:     Hx sleep apnea: Yes/No: No         Pacemaker:  Yes/No: No      Latex allergies: No evidence of latex-specific IgE     Pre-Admission Testing:  Surgeon's Personalized order Set.   Prophylactic Antibiotics Order: Prophylactic antibiotic protocol

## 2025-06-11 NOTE — H&P (VIEW-ONLY)
New Patient  Edison Gunter  10/24/2000    6/11/2025    This patient was referred by Ford Vera DO for evaluation and consultation for umbilical hernia.    Chief Complaint: Bulge and discomfort by umbilicus    History of Present Illness: Patient is a 24-year-old male with a history of prior laparoscopic cholecystectomy in 2022.  He currently works at an auto parts store.  Patient has a history of prior gastric ulcer and pancreatitis.  He states that [facility] has [facility]  Up until recently he had no pain from this but 3 weeks ago started to have significant amount of pain, prompting evaluation.    Review of Systems:  Constitutional: No fevers or chills or recent weight loss, no recent sick contacts  Respiratory: Denies any shortness of breath, productive cough, dyspnea  Cardiovascular: No chest pain  Gastrointestinal: No nausea, vomiting, diarrhea, abdominal pain  Urologic: Denies any dysuria    Past Medical History:    Allergic rhinitis    Stuffy/runny nose all year round    Anxiety    No longer issue    Calculus of kidney    Depression    No longer issue    Esophageal reflux    KIDNEY STONE    Stomach ulcer       Past Surgical History:   Procedure Laterality Date    Cholecystectomy  05 or 06/2022    Other surgical history  2019/2020    EGD    Stomach surgery procedure unlisted  2019    Upper gi endoscopy,diagnosis         Social History     Socioeconomic History    Marital status: Single     Spouse name: Not on file    Number of children: Not on file    Years of education: Not on file    Highest education level: Not on file   Occupational History    Not on file   Tobacco Use    Smoking status: Former     Current packs/day: 0.50     Average packs/day: 0.5 packs/day for 9.7 years (4.9 ttl pk-yrs)     Types: Cigarettes     Start date: 9/20/2015    Smokeless tobacco: Former     Quit date: 2018    Tobacco comments:     Also used dip from late 2017 to midish 2018    Vaping Use    Vaping status: Not on file   Substance and Sexual Activity    Alcohol use: Not Currently    Drug use: Not Currently     Types: Cannabis     Comment: cannabis 1-3 grams daily since 5/2018, last use9/1/18     Sexual activity: Not on file   Other Topics Concern    Caffeine Concern No    Exercise No    Seat Belt No    Special Diet No    Stress Concern No    Weight Concern No   Social History Narrative    Not on file     Social Drivers of Health     Food Insecurity: No Food Insecurity (5/17/2022)    Received from [facility]    Food Insecurity     Currently or in the past 3 months, have you worried your food would run out before you had money to buy more?: No     In the past 12 months, have you run out of food or been unable to get more?: No   Transportation Needs: No Transportation Needs (5/17/2022)    Received from [facility]    Transportation Needs     Currently or in the past 3 months, has lack of transportation kept you from medical appointments, getting food or medicine, or providing care to a family member?: Patient Refused     Has the lack of transportation kept you from meetings, work, or from getting things needed for daily living?: Patient Refused     Medical Transportation Needs?: Patient refused     Daily Living Transportation Needs? [Peds Only] : Patient refused   Stress: Not on file   Housing Stability: Low Risk  (5/21/2023)    Received from [facility]    Housing Stability     Mortgage Payment Concerns?: Not on file     Number of Places Lived in the Last Year: Not on file     Unstable Housing?: Not on file         Current Outpatient Medications:     omeprazole 20 MG Oral Capsule Delayed Release, Take 1 capsule (20 mg total) by mouth 2 (two) times daily. (Patient not taking: Reported on 6/11/2025), Disp: , Rfl:     No Known Allergies    Family History   Problem Relation Age of Onset    Anxiety Sister     Depression Sister     Cancer  Maternal Aunt        Objective/Physical Exam:  General: Alert, orientated x3.  Cooperative.  No apparent distress.  Vital Signs:  Blood pressure 114/71, pulse 65, temperature 98.2 °F (36.8 °C), temperature source Temporal, SpO2 100%. There is no height or weight on file to calculate BMI.  HEENT: NC/AT   Lungs: Even, unlabored  Cardiac: Regular rate and rhythm. No murmur.  Abdomen: Soft, ND/NT, no R/G, cicatrix of prior laparoscopic cholecystectomy, infraumbilical cicatrix.  Bulge at the superior apex of the umbilical stalk, unclear if this is a primary umbilical hernia versus ventral incisional hernia.  Fascial defect appears less than 1 fingers breath in width.  Extremities:  SMAE  Skin: Warm & dry      Labs/Radiology: No cross-sectional imaging available for review    Discussed:   The potential surgical and non-surgical treatment options were discussed with the patient.  The potential risks, benefits, complications outcomes/recovery and alternatives to any proposed surgery were also fully reviewed with the patient. Any proposed surgical procedure was described in detail.  Expected postoperative pain, recuperation and postoperative course was discussed.  The patient verbalizes understanding.  All questions from the patient were discussed in detail to the patient's satisfaction.  No other questions were presented at this time.         Assessment/Plan:      Patient with M3 zone ventral hernia, unclear if this is a primary abdominal wall hernia or an incisional hernia.  Will plan for open repair of umbilical hernia with permanent suture versus mesh suture given small aperture.        Thank you for allowing me to participate in your patient's care.         Montana Bhatt MD    Please note that this report has been produced using speech recognition software and may contain errors related to that system including but not limited to errors in grammar, punctuation and spelling as well as words and phrases that possibly may  have been recognized inappropriately.  If there are any questions or concerns please contact the dictating provider for clarification.        Date of Surgery:   TBD  DX:     ICD-10-CM   1. Incisional hernia, without obstruction or gangrene  K43.2   2. Umbilical hernia without obstruction or gangrene  K42.9        Surgery Site :    [x] Open  [] Laparoscopic  [] Robotic    [] Cholecystectomy with ICG   [] Cholecystectomy with IOC (need C-arm)    [x] Hernia   [] Ventral  [x] Umbilical  [] Inguinal  [] Incisional  [] Paraesophageal [] Component Separation (TAR)     [] Excision of Lipomas     Anesthesia: ANESTHESIA TYPE: MAC    Location: [] [facility]      [x[facility]       [] [facility]   [] [place] [facility]      [] [facility]  [] [facility]            Admission Status: EDW OR ADMIT LOCATION: [facility] Needed: Yes/No: Yes      Clearance Needed:  []Medical Clearance   []Cardiac Clearance:   []Anticoagulation Management:    []Renal:   []Neuro:     Hx sleep apnea: Yes/No: No         Pacemaker:  Yes/No: No      Latex allergies: No evidence of latex-specific IgE     Pre-Admission Testing:  Surgeon's Personalized order Set.   Prophylactic Antibiotics Order: Prophylactic antibiotic protocol

## 2025-06-12 ENCOUNTER — TELEPHONE (OUTPATIENT)
Facility: LOCATION | Age: 25
End: 2025-06-12

## 2025-06-12 DIAGNOSIS — K42.9 UMBILICAL HERNIA WITHOUT OBSTRUCTION OR GANGRENE: ICD-10-CM

## 2025-06-12 DIAGNOSIS — K43.2 INCISIONAL HERNIA WITHOUT OBSTRUCTION OR GANGRENE: Primary | ICD-10-CM

## 2025-06-16 ENCOUNTER — TELEPHONE (OUTPATIENT)
Facility: LOCATION | Age: 25
End: 2025-06-16

## 2025-06-16 DIAGNOSIS — K43.2 INCISIONAL HERNIA WITHOUT OBSTRUCTION OR GANGRENE: Primary | ICD-10-CM

## 2025-06-16 DIAGNOSIS — K42.9 UMBILICAL HERNIA WITHOUT OBSTRUCTION OR GANGRENE: ICD-10-CM

## 2025-06-16 NOTE — TELEPHONE ENCOUNTER
Notification or Prior Authorization is not required for the requested services  Decision ID #: [id number]  The number above acknowledges your inquiry and our response. Please write this number down and refer to it for future inquiries. Coverage and payment for an item or service is governed by the member's benefit plan document, and, if applicable, the provider's participation agreement with the Health Plan.  Patient details    [email or web address]  Patient name  MARGARITO DORSEY  Member number  [id number]  Group number  [id number]  Product  EPO - [employer]  Relationship  Employee  Effective date  01/01/2025  Termination date  12/31/9999  Insurance type  Commercial  Verbal language preference  -  Written language preference  -  info  A future timeline may be available for this member. For future coverage please call the telephone number located on the back of the member's Medical ID card.  Admitting/attending physician details    [email or web address]  Name  Montana Bhatt  Tax ID number  [id number]  Address  [address] [place] [address]  Status  In network  Service details    [email or web address]  Place of service  Outpatient Facility[facility]Outpatient Hospital  What is place of service?  Service details  Surgical  Facility details    [email or web address]_up  Name  [facility]  Tax ID number  [id number]  Address  [address] [place] [address] [phone]  Status  In network  Facility service dates details    [email or web address]  Start date  06/30/2025  End date  09/28/2025  Service description  Scheduled  What is service description?  Diagnosis code details    [email or web address]_up  Code pointer  Primary  Diagnosis code  K43.2  Description  Incisional hernia without obstruction or gangrene  Code pointer  New  Diagnosis code  K42.9  Description  Umbilical hernia without obstruction or gangrene  Procedure code details    [email or web address]_up  Code pointer  Primary  procedure code  63219

## 2025-06-29 ENCOUNTER — ANESTHESIA EVENT (OUTPATIENT)
Dept: SURGERY | Facility: HOSPITAL | Age: 25
End: 2025-06-29
Payer: COMMERCIAL

## 2025-06-29 NOTE — ANESTHESIA PREPROCEDURE EVALUATION
PRE-OP EVALUATION    Patient Name: Edison Gunter    Admit Diagnosis: Incisional hernia without obstruction or gangrene [K43.2]  Umbilical hernia without obstruction or gangrene [K42.9]    Pre-op Diagnosis: Incisional hernia without obstruction or gangrene [K43.2]  Umbilical hernia without obstruction or gangrene [K42.9]    OPEN UMBILICAL HERNIA REPAIR    Anesthesia Procedure: OPEN UMBILICAL HERNIA REPAIR    Surgeons and Role:     * Montana Bhatt MD - Primary    Pre-op vitals reviewed.        Body mass index is 23.57 kg/m².    Current medications reviewed.  Hospital Medications:  Current Medications[1]    Outpatient Medications:   Prescriptions Prior to Admission[2]    Allergies: Patient has no known allergies.      Anesthesia Evaluation    Patient summary reviewed.    Anesthetic Complications  (-) history of anesthetic complications         GI/Hepatic/Renal      (+) GERD                           Cardiovascular                                                       Endo/Other                                  Pulmonary                           Neuro/Psych      (+) depression                                Past Surgical History[3]  Social Hx on file[4]  History   Drug Use Unknown     Comment: cannabis 1-3 grams daily since 5/2018, last use9/1/18      Available pre-op labs reviewed.               Airway      Mallampati: I  Mouth opening: >3 FB  TM distance: > 6 cm  Neck ROM: full Cardiovascular    Cardiovascular exam normal.         Dental    Dentition appears grossly intact         Pulmonary    Pulmonary exam normal.                 Other findings              ASA: 1   Plan: MAC and general  NPO status verified and           Plan/risks discussed with: patient                Present on Admission:  **None**             [1]   No current facility-administered medications on file as of .   [2]   No medications prior to admission.   [3]   Past Surgical History:  Procedure Laterality Date    Cholecystectomy  05 or  06/2022    Other surgical history  2019/2020    EGD    Stomach surgery procedure unlisted  2019    Upper gi endoscopy,diagnosis     [4]   Social History  Socioeconomic History    Marital status: Single   Tobacco Use    Smoking status: Former     Current packs/day: 0.50     Average packs/day: 0.5 packs/day for 9.8 years (4.9 ttl pk-yrs)     Types: Cigarettes     Start date: 9/20/2015    Smokeless tobacco: Former     Quit date: 2018    Tobacco comments:     Also used dip from late 2017 to midish 2018   Vaping Use    Vaping status: Never Used   Substance and Sexual Activity    Alcohol use: Not Currently    Drug use: Not Currently     Types: Cannabis     Comment: cannabis 1-3 grams daily since 5/2018, last use9/1/18    Other Topics Concern    Caffeine Concern No    Exercise No    Seat Belt No    Special Diet No    Stress Concern No    Weight Concern No

## 2025-06-30 ENCOUNTER — HOSPITAL ENCOUNTER (OUTPATIENT)
Facility: HOSPITAL | Age: 25
Setting detail: HOSPITAL OUTPATIENT SURGERY
Discharge: HOME | End: 2025-06-30
Attending: SURGERY | Admitting: SURGERY
Payer: COMMERCIAL

## 2025-06-30 ENCOUNTER — ANESTHESIA (OUTPATIENT)
Dept: SURGERY | Facility: HOSPITAL | Age: 25
End: 2025-06-30
Payer: COMMERCIAL

## 2025-06-30 VITALS
OXYGEN SATURATION: 100 % | RESPIRATION RATE: 16 BRPM | WEIGHT: 153 LBS | SYSTOLIC BLOOD PRESSURE: 133 MMHG | HEART RATE: 55 BPM | DIASTOLIC BLOOD PRESSURE: 66 MMHG | HEIGHT: 68 IN | BODY MASS INDEX: 23.19 KG/M2 | TEMPERATURE: 97 F

## 2025-06-30 DIAGNOSIS — G89.18 POSTOPERATIVE PAIN: Primary | ICD-10-CM

## 2025-06-30 PROBLEM — K42.9 UMBILICAL HERNIA WITHOUT OBSTRUCTION AND WITHOUT GANGRENE: Status: ACTIVE | Noted: 2025-06-30

## 2025-06-30 PROCEDURE — 49591 RPR AA HRN 1ST < 3 CM RDC: CPT | Performed by: SURGERY

## 2025-06-30 PROCEDURE — 49591 RPR AA HRN 1ST < 3 CM RDC: CPT

## 2025-06-30 DEVICE — DURAMESH™ 1 MESH SUTURE: SMALL NEEDLE – USA
Type: IMPLANTABLE DEVICE | Site: UMBILICAL | Status: FUNCTIONAL
Brand: DURAMESH

## 2025-06-30 RX ORDER — SODIUM CHLORIDE, SODIUM LACTATE, POTASSIUM CHLORIDE, CALCIUM CHLORIDE 600; 310; 30; 20 MG/100ML; MG/100ML; MG/100ML; MG/100ML
INJECTION, SOLUTION INTRAVENOUS CONTINUOUS
Status: DISCONTINUED | OUTPATIENT
Start: 2025-06-30 | End: 2025-06-30

## 2025-06-30 RX ORDER — SCOPOLAMINE 1 MG/3D
1 PATCH, EXTENDED RELEASE TRANSDERMAL ONCE
Status: DISCONTINUED | OUTPATIENT
Start: 2025-06-30 | End: 2025-06-30 | Stop reason: HOSPADM

## 2025-06-30 RX ORDER — HYDROMORPHONE HYDROCHLORIDE 1 MG/ML
0.4 INJECTION, SOLUTION INTRAMUSCULAR; INTRAVENOUS; SUBCUTANEOUS EVERY 5 MIN PRN
Status: DISCONTINUED | OUTPATIENT
Start: 2025-06-30 | End: 2025-06-30

## 2025-06-30 RX ORDER — ACETAMINOPHEN 500 MG
1000 TABLET ORAL ONCE
Status: DISCONTINUED | OUTPATIENT
Start: 2025-06-30 | End: 2025-06-30 | Stop reason: HOSPADM

## 2025-06-30 RX ORDER — LIDOCAINE HYDROCHLORIDE 10 MG/ML
INJECTION, SOLUTION INFILTRATION; PERINEURAL AS NEEDED
Status: DISCONTINUED | OUTPATIENT
Start: 2025-06-30 | End: 2025-06-30 | Stop reason: HOSPADM

## 2025-06-30 RX ORDER — ACETAMINOPHEN 500 MG
1000 TABLET ORAL EVERY 6 HOURS PRN
COMMUNITY

## 2025-06-30 RX ORDER — PHENYLEPHRINE HCL 10 MG/ML
VIAL (ML) INJECTION AS NEEDED
Status: DISCONTINUED | OUTPATIENT
Start: 2025-06-30 | End: 2025-06-30 | Stop reason: SURG

## 2025-06-30 RX ORDER — MIDAZOLAM HYDROCHLORIDE 1 MG/ML
INJECTION INTRAMUSCULAR; INTRAVENOUS AS NEEDED
Status: DISCONTINUED | OUTPATIENT
Start: 2025-06-30 | End: 2025-06-30 | Stop reason: SURG

## 2025-06-30 RX ORDER — BUPIVACAINE HYDROCHLORIDE 2.5 MG/ML
INJECTION, SOLUTION EPIDURAL; INFILTRATION; INTRACAUDAL; PERINEURAL AS NEEDED
Status: DISCONTINUED | OUTPATIENT
Start: 2025-06-30 | End: 2025-06-30 | Stop reason: HOSPADM

## 2025-06-30 RX ORDER — LIDOCAINE HYDROCHLORIDE 10 MG/ML
INJECTION, SOLUTION EPIDURAL; INFILTRATION; INTRACAUDAL; PERINEURAL AS NEEDED
Status: DISCONTINUED | OUTPATIENT
Start: 2025-06-30 | End: 2025-06-30 | Stop reason: SURG

## 2025-06-30 RX ORDER — ACETAMINOPHEN 500 MG
1000 TABLET ORAL ONCE AS NEEDED
Status: DISCONTINUED | OUTPATIENT
Start: 2025-06-30 | End: 2025-06-30

## 2025-06-30 RX ORDER — OXYCODONE HYDROCHLORIDE 5 MG/1
5 TABLET ORAL EVERY 6 HOURS PRN
Qty: 5 TABLET | Refills: 0 | Status: SHIPPED | OUTPATIENT
Start: 2025-06-30 | End: 2025-07-10

## 2025-06-30 RX ORDER — HYDROCODONE BITARTRATE AND ACETAMINOPHEN 5; 325 MG/1; MG/1
1 TABLET ORAL ONCE AS NEEDED
Status: DISCONTINUED | OUTPATIENT
Start: 2025-06-30 | End: 2025-06-30

## 2025-06-30 RX ORDER — HYDROCODONE BITARTRATE AND ACETAMINOPHEN 5; 325 MG/1; MG/1
2 TABLET ORAL ONCE AS NEEDED
Status: DISCONTINUED | OUTPATIENT
Start: 2025-06-30 | End: 2025-06-30

## 2025-06-30 RX ORDER — HYDROMORPHONE HYDROCHLORIDE 1 MG/ML
0.6 INJECTION, SOLUTION INTRAMUSCULAR; INTRAVENOUS; SUBCUTANEOUS EVERY 5 MIN PRN
Status: DISCONTINUED | OUTPATIENT
Start: 2025-06-30 | End: 2025-06-30

## 2025-06-30 RX ORDER — KETOROLAC TROMETHAMINE 30 MG/ML
INJECTION, SOLUTION INTRAMUSCULAR; INTRAVENOUS AS NEEDED
Status: DISCONTINUED | OUTPATIENT
Start: 2025-06-30 | End: 2025-06-30 | Stop reason: SURG

## 2025-06-30 RX ORDER — ONDANSETRON 2 MG/ML
4 INJECTION INTRAMUSCULAR; INTRAVENOUS EVERY 6 HOURS PRN
Status: DISCONTINUED | OUTPATIENT
Start: 2025-06-30 | End: 2025-06-30

## 2025-06-30 RX ORDER — NALOXONE HYDROCHLORIDE 0.4 MG/ML
0.08 INJECTION, SOLUTION INTRAMUSCULAR; INTRAVENOUS; SUBCUTANEOUS AS NEEDED
Status: DISCONTINUED | OUTPATIENT
Start: 2025-06-30 | End: 2025-06-30

## 2025-06-30 RX ORDER — HYDROMORPHONE HYDROCHLORIDE 1 MG/ML
0.2 INJECTION, SOLUTION INTRAMUSCULAR; INTRAVENOUS; SUBCUTANEOUS EVERY 5 MIN PRN
Status: DISCONTINUED | OUTPATIENT
Start: 2025-06-30 | End: 2025-06-30

## 2025-06-30 RX ORDER — PROCHLORPERAZINE EDISYLATE 5 MG/ML
5 INJECTION INTRAMUSCULAR; INTRAVENOUS EVERY 8 HOURS PRN
Status: DISCONTINUED | OUTPATIENT
Start: 2025-06-30 | End: 2025-06-30

## 2025-06-30 RX ORDER — ONDANSETRON 2 MG/ML
INJECTION INTRAMUSCULAR; INTRAVENOUS AS NEEDED
Status: DISCONTINUED | OUTPATIENT
Start: 2025-06-30 | End: 2025-06-30 | Stop reason: SURG

## 2025-06-30 RX ADMIN — SODIUM CHLORIDE, SODIUM LACTATE, POTASSIUM CHLORIDE, CALCIUM CHLORIDE: 600; 310; 30; 20 INJECTION, SOLUTION INTRAVENOUS at 11:15:00

## 2025-06-30 RX ADMIN — PHENYLEPHRINE HCL 50 MCG: 10 MG/ML VIAL (ML) INJECTION at 11:12:00

## 2025-06-30 RX ADMIN — SODIUM CHLORIDE, SODIUM LACTATE, POTASSIUM CHLORIDE, CALCIUM CHLORIDE: 600; 310; 30; 20 INJECTION, SOLUTION INTRAVENOUS at 10:18:00

## 2025-06-30 RX ADMIN — LIDOCAINE HYDROCHLORIDE 50 MG: 10 INJECTION, SOLUTION EPIDURAL; INFILTRATION; INTRACAUDAL; PERINEURAL at 10:22:00

## 2025-06-30 RX ADMIN — MIDAZOLAM HYDROCHLORIDE 2 MG: 1 INJECTION INTRAMUSCULAR; INTRAVENOUS at 10:18:00

## 2025-06-30 RX ADMIN — PHENYLEPHRINE HCL 100 MCG: 10 MG/ML VIAL (ML) INJECTION at 10:56:00

## 2025-06-30 RX ADMIN — ONDANSETRON 4 MG: 2 INJECTION INTRAMUSCULAR; INTRAVENOUS at 11:09:00

## 2025-06-30 RX ADMIN — KETOROLAC TROMETHAMINE 30 MG: 30 INJECTION, SOLUTION INTRAMUSCULAR; INTRAVENOUS at 11:09:00

## 2025-06-30 NOTE — ANESTHESIA POSTPROCEDURE EVALUATION
[facility]    Edison Gunter Patient Status:  Hospital Outpatient Surgery   Age/Gender 24 year old male MRN [id number]   Location [facility] PERIOPERATIVE SERVICE Attending Montana Bhatt MD   Hosp Day # 0 PCP Ford Vera DO       Anesthesia Post-op Note    OPEN UMBILICAL HERNIA REPAIR    Procedure Summary       Date: 06/30/25 Room / Location:  MAIN OR 05 /  MAIN OR    Anesthesia Start: 1017 Anesthesia Stop: 1130    Procedure: OPEN UMBILICAL HERNIA REPAIR (Abdomen) Diagnosis:       Incisional hernia without obstruction or gangrene      Umbilical hernia without obstruction or gangrene      (Incisional hernia without obstruction or gangrene [K43.2]Umbilical hernia without obstruction or gangrene [K42.9])    Surgeons: Montana Bhatt MD Anesthesiologist: Nehemias Page MD    Anesthesia Type: MAC, general ASA Status: 1            Anesthesia Type: MAC, general    Vitals Value Taken Time   /81 06/30/25 11:35   Temp 97.2 °F (36.2 °C) 06/30/25 11:28   Pulse 59 06/30/25 11:38   Resp 16 06/30/25 11:28   SpO2 99 % 06/30/25 11:38   Vitals shown include unfiled device data.        Patient Location: Same Day Surgery    Anesthesia Type: MAC    Airway Patency: patent    Postop Pain Control: adequate    Mental Status: mildly sedated but able to meaningfully participate in the post-anesthesia evaluation    Nausea/Vomiting: none    Cardiopulmonary/Hydration status: stable euvolemic    Complications: no apparent anesthesia related complications    Postop vital signs: stable    Dental Exam: Unchanged from Preop    Patient to be discharged home when criteria met.

## 2025-06-30 NOTE — INTERVAL H&P NOTE
Pre-op Diagnosis: Incisional hernia without obstruction or gangrene [K43.2]  Umbilical hernia without obstruction or gangrene [K42.9]    The above referenced H&P was reviewed by Montana Bhatt MD on 6/30/2025, the patient was examined and no significant changes have occurred in the patient's condition since the H&P was performed.  I discussed with the patient and/or legal representative the potential benefits, risks and side effects of this procedure; the likelihood of the patient achieving goals; and potential problems that might occur during recuperation.  I discussed reasonable alternatives to the procedure, including risks, benefits and side effects related to the alternatives and risks related to not receiving this procedure.  We will proceed with procedure as planned.    Plan for open umbilical hernia repair with likely mesh suture placement.   All questions answered.

## 2025-06-30 NOTE — DISCHARGE INSTRUCTIONS
Home Care Instructions  Umbilical Hernia Repair  Dr. Bhatt    WHAT TO EXPECT  You may experience abdominal pain at the incision both at rest and with movement. You may notice a firmness under the incision, this is very common and generally represents the healing process.  You may notice some bruising around the incision, this will resolve over the coming weeks. If taking narcotic medications, you may experience constipation. If you have not had a bowel movement by the second day after surgery, take Miralax 15g (one cap full) every 8 hours until you have a bowel movement. If another 24 hours goes by without a bowel movement, then you can take a dose of magnesium citrate or milk of magnesia. You may experience sore throat, this is due to the breathing tube used during surgery. You may experience mild nausea and vomiting for the first 24 hours. This is generally due to the anesthesia medications. This should resolve quickly.    MEDICATIONS  You can take Tylenol 1000mg (2 Extra Strength Tylenol) every 8 hours for pain. For the first 48 hours it is best to take the Tylenol every 8 hours even if you do not feel much pain. For moderate to severe post-operative pain control you will be prescribed Oxycodone. Take one pill (5mg) every six hours as needed for pain. If you do not feel that narcotics are necessary you shouldn’t take them. If the pain is severe you may take two pills every six hours, taking care not to exceed 8 pills in a 24 hour period. You can also take Advil (ibuprofen) 800mg every 8 hours or Alleve (naproxen) 500mg every 12 hours. Please ask your surgeon before resuming blood thinners such as aspirin, plavix or coumadin. All other home medications may be resumed as scheduled.     DIET  There are no diet restrictions. You may resume a general diet immediately, however it is best to start light and bland. This is not a good time to eat excessively.  Minimize high fat foods and dairy products in the immediate  post-operative time period as this may cause some intestinal discomfort or loose stools. There should be no alcohol consumption in the immediate recover time period or within six hours of taking narcotics.    WOUND CARE  The top dressing including clear plastic and gauze may be removed 48 hours after surgery. Do not remove the steri-strips that are adherent to the skin. The steri-strips will eventually peel away from the skin and at that point they may be removed. This is usually seven to fourteen days after surgery. You may shower 24 hours after surgery. It is ok to get the clear plastic dressing wet. It is ok to get the steri strips wet. After you remove the clear plastic and gauze, you do not need to cover the incision. Soap and water can get on the incision. Do not scrub the incision. No hair dye or chemicals of any kind should get on the incision. Do not submerge the incision under water (tub bathing, swimming) for one month. There are absorbable stitches under the skin that will dissolve over time. Do not apply any neosporin, hydrogen peroxide, or any other topical agents to the incision.    ACTIVITY  Every day you should be up out of bed, showered and dressed. Do not stay in bed all day. You should be up and walking around the house frequently. It is normal to feel tired or fatigued for the first week after surgery. Walking helps to avoid pneumonia and blood clots in the legs. You can also go to the store or walk outside to get out of the house and to stay active. Do not drive for at least a week. Do not drive within 8 hours of taking a narcotic medication. You must be able to react quickly to avoid a traffic accident without pain before you can drive. The average time off work or school  is 7 to 14 days. You may walk up and down stairs and lift up to fifteen pounds but no bending, pushing or pulling.     APPOINTMENT  Please call our office as soon as possible for an appointment at [phone]. Please call  our office immediately for fever greater than 100.5, extensive bleeding, inability to urinate.  For life threatening emergencies such as severe chest pain, shortness of breath, loss of conciousness call 911. For non-emergent care please call our office after 8:30 a.m. Monday through Friday. The number above directs to the answering service after hours to reach the on-call physician.

## 2025-06-30 NOTE — OPERATIVE REPORT
[facility]   part of [facility]    Operative Note         Edison Gunter Location: OR   CSN [id number] MRN [id number]   Admission Date 6/30/2025 Operation Date 6/30/2025   Attending Physician Montana Bhatt MD       Patient Name: Edison Gunter     Preoperative Diagnosis: Incisional hernia without obstruction or gangrene [K43.2]  Umbilical hernia without obstruction or gangrene [K42.9]     Postoperative Diagnosis: Incisional hernia without obstruction or gangrene [K43.2]Umbilical hernia without obstruction or gangrene [K42.9]     Procedure(s):  OPEN UMBILICAL HERNIA REPAIR     Primary Surgeon: Montana Bhatt MD     PA: Heydi Landeros PA-C     Anesthesia: MAC     Specimen: * No specimens in log *     Estimated Blood Loss: Blood Output: 10 mL (6/30/2025 11:22 AM)       Complications: none      Indications for procedure: Symptomatic umbilical hernia     Surgical Findings: 1cm M3 zone defect with preperitoneal fat     Operative Summary:    The patient was brought to the operating room, and, after the induction of anesthesia, the abdomen was prepped and draped in the usual sterile fashion.  A 3cm infraumbilical incision was marked. This was incised with scalpel and electrocautery and dissection was continued to the level of the fascia. The umbilical stalk was dissected free of surrounding subcutaneous tissue. The umbilicus was transected using electrocautery just deep to the reflection of the umbilical skin fold.  At this point, a 1cm defect was identified.  The anterior fascia was cleared of subcutaneous tissue. The anterior rectus fascia was closed using #1 Duramesh.   The umbilicus was pexied to the level of the fascia using 2-0 Vicryl suture.  The same suture was used to approximate the incision at 3 points with the deep dermal sutures.  A 4-0 Monocryl suture was then used for skin closure in a running fashion.  This was dressed with Steri-Strips, 2 x 2 and a suction Tegaderm pressure dressing.        Implants:   Implant Name Type Inv. Item Serial No.  Lot No. LRB No. Used Action   DURAMESH Mesh  NA  [employer] [id number] N/A 1 Implanted        Drains: None     Condition: Stable       Montana Bhatt MD

## 2025-07-10 ENCOUNTER — OFFICE VISIT (OUTPATIENT)
Facility: LOCATION | Age: 25
End: 2025-07-10
Payer: COMMERCIAL

## 2025-07-10 VITALS
DIASTOLIC BLOOD PRESSURE: 66 MMHG | OXYGEN SATURATION: 96 % | HEART RATE: 74 BPM | SYSTOLIC BLOOD PRESSURE: 107 MMHG | TEMPERATURE: 99 F

## 2025-07-10 DIAGNOSIS — Z98.890 POST-OPERATIVE STATE: Primary | ICD-10-CM

## 2025-07-10 NOTE — PROGRESS NOTES
Post Operative Visit Note       Active Problems  1. Post-operative state         Chief Complaint   Chief Complaint   Patient presents with    Post-Op     PO- 6/30 w/ vva. OPEN UMBILICAL HERNIA REPAIR. Pt feeling well. Pt has some discomfort when bending. Pt states his incisions feel fine. Pt denies any other symptoms          History of Present Illness   The patient presents today for postoperative visit following open umbilical hernia repair on 6/30/2025 by Dr. Bhatt.    The patient states that since his surgery that he is overall doing very well.  He states that his postoperative pain has nearly fully resolved, he is no longer taking oral analgesics.  He states that occasionally with twisting and bending motions, he feels some discomfort at the site of his incision, but states he is otherwise feeling very well.  He states he is tolerating a general diet, denies nausea or vomiting.  He denies diarrhea or constipation.  He denies fever or chills.  He states that his incisions are healing well.  There is no redness or drainage noted.    Patient is requesting return to work letter today in office.  Patient would like to return to work on Monday, 7/14/2025.  Patient states that his employer offers light duty options, that would comply with his lifting restrictions.      Allergies  Edison has no known allergies.    Past Medical / Surgical / Social / Family History    The past medical and past surgical history have been reviewed by me today.     Past Medical History:    Allergic rhinitis    Stuffy/runny nose all year round    Anxiety    No longer issue    Calculus of kidney    Depression    No longer issue    Esophageal reflux    KIDNEY STONE    Stomach ulcer    Visual impairment    glasses     Past Surgical History:   Procedure Laterality Date    Cholecystectomy  05 or 06/2022    Hernia surgery  06/30/2025    Other surgical history  2019/2020    EGD    Stomach surgery procedure unlisted  2019    Upper gi  endoscopy,diagnosis         The family history and social history have been reviewed by me today.    Family History   Problem Relation Age of Onset    Anxiety Sister     Depression Sister     Cancer Maternal Aunt      Social History     Socioeconomic History    Marital status: Single   Tobacco Use    Smoking status: Former     Current packs/day: 0.50     Average packs/day: 0.5 packs/day for 9.8 years (4.9 ttl pk-yrs)     Types: Cigarettes     Start date: 9/20/2015    Smokeless tobacco: Former     Quit date: 2018    Tobacco comments:     Also used dip from late 2017 to midish 2018   Vaping Use    Vaping status: Never Used   Substance and Sexual Activity    Alcohol use: Not Currently    Drug use: Not Currently     Types: Cannabis     Comment: cannabis 1-3 grams daily since 5/2018, last use9/1/18    Other Topics Concern    Caffeine Concern No    Exercise No    Seat Belt No    Special Diet No    Stress Concern No    Weight Concern No        Current Outpatient Medications:     acetaminophen 500 MG Oral Tab, Take 2 tablets (1,000 mg total) by mouth every 6 (six) hours as needed for Pain., Disp: , Rfl:     oxyCODONE 5 MG Oral Tab, Take 1 tablet (5 mg total) by mouth every 6 (six) hours as needed. (Patient not taking: Reported on 7/10/2025), Disp: 5 tablet, Rfl: 0      Review of Systems  A 10 point Review of Systems has been completed by me today and is negative except as above in the HPI.    Physical Findings   /66 (BP Location: Left arm, Patient Position: Sitting, Cuff Size: adult)   Pulse 74   Temp 98.8 °F (37.1 °C) (Temporal)   SpO2 96%   Gen/psych: alert and oriented, cooperative, no apparent distress  Cardiovascular: regular rate  Respiratory: respirations unlabored, no wheeze  Abdominal: soft, non-tender, non-distended, no guarding/rebound  Incisions: Umbilical incision with Steri-Strips in place, clean, dry, intact.  Skin edges are well-approximated, there is no surrounding erythema.  There are no  exudates.         Assessment/Plan  1. Post-operative state        Patient is doing well postoperatively. Incision site is healing well, there are no signs of infection.  The patient may continue a general diet, with no restrictions.  Continue to keep incision site clean and dry. Allow soap and water to rinse over incision during showers, avoid vigorous scrubbing to the sites. Pat to dry.  Steri-Strips will fall off on their own over time.  The patient is to continue following lifting restrictions for 6 weeks post-operatively. No lifting > 20 lbs.  Return to work letter was generated in office for patient today.  The patient may return to work on 7/14/2025, with lifting restrictions of no greater than 20 pounds for 6 weeks postoperatively.  The patient will be cleared from restrictions and may resume full duty on 8/11/2025.  All of the patient's questions and concerns were addressed during today's visit.  Patient may follow up in clinic as needed.           No orders of the defined types were placed in this encounter.       Imaging & Referrals   None    Follow Up  Return if symptoms worsen or fail to improve.    Heydi Landeros PA-C  [facility] General Surgery  7/10/2025  11:19 AM

## 2025-08-09 ENCOUNTER — OFFICE VISIT (OUTPATIENT)
Dept: FAMILY MEDICINE CLINIC | Facility: CLINIC | Age: 25
End: 2025-08-09

## 2025-08-09 VITALS
TEMPERATURE: 97 F | BODY MASS INDEX: 24.25 KG/M2 | RESPIRATION RATE: 18 BRPM | SYSTOLIC BLOOD PRESSURE: 99 MMHG | HEART RATE: 97 BPM | DIASTOLIC BLOOD PRESSURE: 79 MMHG | WEIGHT: 160 LBS | HEIGHT: 68 IN | OXYGEN SATURATION: 98 %

## 2025-08-09 DIAGNOSIS — L98.0 PYOGENIC GRANULOMA: Primary | ICD-10-CM

## 2025-08-09 DIAGNOSIS — Z51.89 VISIT FOR WOUND CHECK: ICD-10-CM

## 2025-08-09 PROCEDURE — 99213 OFFICE O/P EST LOW 20 MIN: CPT | Performed by: PHYSICIAN ASSISTANT

## 2025-08-13 ENCOUNTER — OFFICE VISIT (OUTPATIENT)
Facility: LOCATION | Age: 25
End: 2025-08-13

## 2025-08-13 VITALS
OXYGEN SATURATION: 97 % | SYSTOLIC BLOOD PRESSURE: 110 MMHG | DIASTOLIC BLOOD PRESSURE: 72 MMHG | TEMPERATURE: 98 F | HEART RATE: 75 BPM

## 2025-08-13 DIAGNOSIS — T81.89XA SUTURE GRANULOMA, INITIAL ENCOUNTER: Primary | ICD-10-CM

## 2025-08-13 DIAGNOSIS — Z09 S/P UMBILICAL HERNIA REPAIR, FOLLOW-UP EXAM: ICD-10-CM

## (undated) DEVICE — GLOVE SUR 7.5 SENSICARE PI PIP CRM PWD F

## (undated) DEVICE — SUT MCRYL 4-0 18IN PS-2 ABSRB UD 19MM 3/8 CIR

## (undated) DEVICE — SUT COAT VCRL 2-0 27IN RB-1 ABSRB VLT 17MM 1/

## (undated) DEVICE — SOLUTION IRRIG 1000ML 0.9% NACL USP BTL

## (undated) DEVICE — SUT PROL 2-0 30IN SH NABSRB BLU L26MM 1/2 CIR

## (undated) DEVICE — APPLICATOR PREP 26ML CHG 2% ISO ALC 70%

## (undated) DEVICE — GLOVE SUR 7.5 SENSICARE PI PIP GRN PWD F

## (undated) DEVICE — MINI LAP PACK-LF: Brand: MEDLINE INDUSTRIES, INC.

## (undated) DEVICE — SUT COAT VCRL 2-0 27IN SH ABSRB UD 26MM 1/2

## (undated) DEVICE — COVER,LIGHT,CAMERA,HARD,1/PK,STRL: Brand: MEDLINE

## (undated) DEVICE — SYRINGE MED 30ML STD CLR PLAS LL TIP N CTRL

## (undated) DEVICE — SLEEVE COMPR MD KNEE LEN SGL USE KENDALL SCD

## (undated) NOTE — LETTER
7/10/2025    Return to work    Name: Edison Gunter        : 10/24/2000    To Whom It May Concern,    Edison Gunter had surgery on 25 and is:    Able to return to school / work with restrictions:  No lifting over: 20 lbs until 25.    The patient may return to work on 25.    If there are any further questions, regarding this patient's care, please contact the patient directly.    Sincerely,    Dr Jauregui

## (undated) NOTE — LETTER
Date: 4/11/2019    Patient Name: Silva Vilchis          To Whom it may concern: This letter has been written at the patient's request. The above patient was seen at the [facility] for treatment of a medical condition.     This patient

## (undated) NOTE — LETTER
Date: 6/9/2022    Patient Name: Chencho Pinon          To Whom it may concern: This letter has been written at the patient's request. The above patient was seen at the [facility] for treatment of a medical condition. Please excuse his absence.              Sincerely,    JALYN Lange

## (undated) NOTE — LETTER
Date: 2/26/2019    Patient Name: Lew Montes          To Whom it may concern: This letter has been written at the patient's request. The above patient was seen at the [facility] for treatment of a medical condition.     This patient

## (undated) NOTE — LETTER
Date: 11/5/2019    Patient Name: Robert Britton          To Whom it may concern: The above patient was seen at the [facility] for treatment of a medical condition.     This patient should be excused from attending work 11/6/19 and 11/7/

## (undated) NOTE — LETTER
Date: 9/20/2018    Patient Name: Mariela Zapata          To Whom it may concern: This letter has been written at the patient's request. The above patient was seen at the [facility] for treatment of a medical condition.     This patient

## (undated) NOTE — LETTER
Date: 1/29/2019    Patient Name: Danii Dias          To Whom it may concern: This letter has been written at the patient's request. The above patient was seen at the [facility] for treatment of a medical condition.     This patient